# Patient Record
Sex: MALE | Race: WHITE | NOT HISPANIC OR LATINO | Employment: OTHER
[De-identification: names, ages, dates, MRNs, and addresses within clinical notes are randomized per-mention and may not be internally consistent; named-entity substitution may affect disease eponyms.]

---

## 2021-12-30 ENCOUNTER — TRANSCRIBE ORDERS (OUTPATIENT)
Dept: OTHER | Age: 71
End: 2021-12-30

## 2021-12-30 DIAGNOSIS — H02.839 DERMATOCHALASIS: Primary | ICD-10-CM

## 2022-01-26 ENCOUNTER — OFFICE VISIT (OUTPATIENT)
Dept: OPHTHALMOLOGY | Facility: CLINIC | Age: 72
End: 2022-01-26
Payer: COMMERCIAL

## 2022-01-26 DIAGNOSIS — H02.831 DERMATOCHALASIS OF BOTH UPPER EYELIDS: Primary | ICD-10-CM

## 2022-01-26 DIAGNOSIS — H57.813 BROW PTOSIS, BILATERAL: ICD-10-CM

## 2022-01-26 DIAGNOSIS — H02.834 DERMATOCHALASIS OF BOTH UPPER EYELIDS: Primary | ICD-10-CM

## 2022-01-26 PROCEDURE — 92081 LIMITED VISUAL FIELD XM: CPT | Mod: GC | Performed by: OPHTHALMOLOGY

## 2022-01-26 PROCEDURE — 99204 OFFICE O/P NEW MOD 45 MIN: CPT | Mod: GC | Performed by: OPHTHALMOLOGY

## 2022-01-26 PROCEDURE — 92285 EXTERNAL OCULAR PHOTOGRAPHY: CPT | Mod: GC | Performed by: OPHTHALMOLOGY

## 2022-01-26 RX ORDER — LISINOPRIL AND HYDROCHLOROTHIAZIDE 12.5; 2 MG/1; MG/1
1 TABLET ORAL DAILY
COMMUNITY

## 2022-01-26 RX ORDER — TADALAFIL 20 MG/1
20 TABLET ORAL DAILY PRN
COMMUNITY

## 2022-01-26 RX ORDER — NICOTINE POLACRILEX 4 MG/1
20 GUM, CHEWING ORAL DAILY
COMMUNITY

## 2022-01-26 RX ORDER — SIMVASTATIN 40 MG
40 TABLET ORAL AT BEDTIME
COMMUNITY

## 2022-01-26 RX ORDER — FINASTERIDE 5 MG/1
1 TABLET, FILM COATED ORAL DAILY
COMMUNITY
Start: 2021-01-28

## 2022-01-26 RX ORDER — AZITHROMYCIN 250 MG/1
1 TABLET, FILM COATED ORAL DAILY
COMMUNITY
Start: 2021-11-23 | End: 2022-11-22

## 2022-01-26 ASSESSMENT — CONF VISUAL FIELD: COMMENTS: TANGENT VISUAL FIELD PERFORMED TODAY.

## 2022-01-26 ASSESSMENT — TONOMETRY
IOP_METHOD: ICARE
OD_IOP_MMHG: 17
OS_IOP_MMHG: 16

## 2022-01-26 ASSESSMENT — LAGOPHTHALMOS
OS_LAGOPHTHALMOS: 0
OD_LAGOPHTHALMOS: 0

## 2022-01-26 ASSESSMENT — VISUAL ACUITY
METHOD: SNELLEN - LINEAR
OD_SC: 20/20
OD_SC+: -2
OS_SC: 20/50

## 2022-01-26 ASSESSMENT — EXTERNAL EXAM - RIGHT EYE: OD_EXAM: BROW PTOSIS, WITH FRONTALIS RELAXED, BROW IS BELOW SUPERIOR ORBITAL RIM AND LATERALLY INLINE WITH UPPER LASHES

## 2022-01-26 ASSESSMENT — MARGIN REFLEX DISTANCE
OD_MRD1: 2
OS_MRD1: 1

## 2022-01-26 ASSESSMENT — EXTERNAL EXAM - LEFT EYE: OS_EXAM: BROW PTOSIS, WITH FRONTALIS RELAXED, BROW IS BELOW SUPERIOR ORBITAL RIM AND LATERALLY INLINE WITH UPPER LASHES

## 2022-01-26 ASSESSMENT — SLIT LAMP EXAM - LIDS
COMMENTS: HEAVY DERMATOCHALASIS RESTING ON LASHES, TRUE PTOSIS
COMMENTS: HEAVY DERMATOCHALASIS RESTING ON LASHES, TRUE PTOSIS

## 2022-01-26 ASSESSMENT — LEVATOR FUNCTION
OS_LEVATOR: 15
OD_LEVATOR: 15

## 2022-01-26 NOTE — LETTER
2022         RE:  :  MRN: Regis Bryant  1950  [id number]     Dear Dr. Huang,    Thank you for asking me to see your patient, Regis Bryant, for an oculoplastic   consultation.  My assessment and plan are below.  For further details, please see my attached clinic note.           HPI:     Chief Complaint(s) and History of Present Illness(es)     Droopy Eye Lid Evaluation     Laterality: right upper lid and left upper lid       Comments     Patient referred by Dr. Huang at [facility] for dermatochalasis   consultation. Pt notes peripheral vision is not as good as it should be.   Lids seem to be getting in the way of his ability to see things.      Regis Bryant is a 71 year old male who has noted gradual onset of droopy eyelids over the past years. The droopy eyelid is interfering with activities of daily living including driving, and reading. He can sometimes see his eyelids when looking up and to the sides. The patient denies double vision, variability of the eyelid position, or dry eye symptoms.     EXAM:     MRD1: 2 mm on right, 1 mm on left  PFH:  12 mm on right, 10 mm on left  Dermatochalasis with excess skin touching eyelashes   Brow ptosis with brow resting below superior orbital rim     VISUAL FIELD:  Right eye untaped:35 degrees Right eye taped:50 degrees  Left eye untaped:35 degrees Left eye taped:50 degrees    Assessment & Plan     Regis Bryant is a 71 year old male with the following diagnoses:   1. Dermatochalasis of both upper eyelids    2. Brow ptosis, bilateral       PLAN:  Both upper eyelid blepharoplasty and external browpexy    We did discuss he has rather significant brow ptosis. We discussed brow lifting options including forehead lifting, direct brow and EBP. He would like to proceed with conservative options.    Due to significant brow ptosis, blepharoplasty alone would be unsuccesfull in addressing the lateral hooding which is  obstructing vision. Blepharoplasty alone would result in sewing very thin eyelid skin to thick sub-brow skin, and even with that, fail to address lateral hooding which is obstructing vision.      ANTICOAGULATION:  None        Again, thank you for allowing me to participate in the care of your patient.      Sincerely,    Alban Mensah MD  Department of Ophthalmology and Visual Neurosciences  [facility]    CC: Denise Huang, SB  Via Fax: [phone]

## 2022-01-26 NOTE — PROGRESS NOTES
Oculoplastic Clinic New Patient    Patient: Regis Bryant MRN# [id number]   YOB: 1950 Age: 71 year old   Date of Visit: Jan 26, 2022    CC: Droopy eyelids obstructing vision.              HPI:     Chief Complaint(s) and History of Present Illness(es)     Droopy Eye Lid Evaluation     Laterality: right upper lid and left upper lid       Comments     Patient referred by Dr. Huang at [facility] for dermatochalasis   consultation. Pt notes peripheral vision is not as good as it should be.   Lids seem to be getting in the way of his ability to see things.      Regis Bryant is a 71 year old male who has noted gradual onset of droopy eyelids over the past years. The droopy eyelid is interfering with activities of daily living including driving, and reading. He can sometimes see his eyelids when looking up and to the sides. The patient denies double vision, variability of the eyelid position, or dry eye symptoms.     EXAM:     MRD1: 2 mm on right, 1 mm on left  PFH:  12 mm on right, 10 mm on left  Dermatochalasis with excess skin touching eyelashes   Brow ptosis with brow resting below superior orbital rim     VISUAL FIELD:  Right eye untaped:35 degrees Right eye taped:50 degrees  Left eye untaped:35 degrees Left eye taped:50 degrees    Assessment & Plan     Regis Bryant is a 71 year old male with the following diagnoses:   1. Dermatochalasis of both upper eyelids    2. Brow ptosis, bilateral       PLAN:  Both upper eyelid blepharoplasty and external browpexy [id number].    Note, Ana Rosa's friend.     We did discuss he has rather significant brow ptosis. We discussed brow lifting options including forehead lifting, direct brow and EBP. He would like to proceed with conservative options.    Due to significant brow ptosis, blepharoplasty alone would be unsuccesfull in addressing the lateral hooding which is obstructing vision. Blepharoplasty alone would result in sewing  very thin eyelid skin to thick sub-brow skin, and even with that, fail to address lateral hooding which is obstructing vision.      ANTICOAGULATION:  None         PHOTOS DEMONSTRATE:  Significant dermatochalasis with lids resting on eyelashes and obstructing visual axis  Brow ptosis with thicker brow skin and hairs below the lateral superior orbital rim    Dino Chambers MD  Resident Physician, PGY-2  Ophthalmology      Attending Physician Attestation: Complete documentation of historical and exam elements from today's encounter can be found in the full encounter summary report (not reduplicated in this progress note). I personally obtained the chief complaint(s) and history of present illness. I confirmed and edited as necessary the review of systems, past medical/surgical history, family history, social history, and examination findings as documented by others; and I examined the patient myself. I personally reviewed the relevant tests, images, and reports as documented above. I formulated and edited as necessary the assessment and plan and discussed the findings and management plan with the patient. Alban Mensah MD    Today with Regis Bryant I reviewed the indications, risks, benefits, and alternatives of the proposed surgical procedure including, but not limited to, failure obtain the desired result  and need for additional surgery, bleeding, infection, loss of vision, loss of the eye, and the remote possibility of permanent damage to any organ system or death with the use of anesthesia.  I provided multiple opportunities for the questions, answered all questions to the best of my ability, and confirmed that my answers and my discussion were understood.

## 2022-01-26 NOTE — NURSING NOTE
Chief Complaints and History of Present Illnesses   Patient presents with     Droopy Eye Lid Evaluation       Chief Complaint(s) and History of Present Illness(es)     Droopy Eye Lid Evaluation     Laterality: right upper lid and left upper lid              Comments     Patient referred by Dr. Huang at [facility] for dermatochalasis consultation. Pt notes peripheral vision is not as good as it should be. Lids seem to be getting in the way of his ability to see things.                     Kandis Villalta, COA

## 2022-02-11 DIAGNOSIS — Z11.59 ENCOUNTER FOR SCREENING FOR OTHER VIRAL DISEASES: Primary | ICD-10-CM

## 2022-02-23 ENCOUNTER — TRANSFERRED RECORDS (OUTPATIENT)
Dept: HEALTH INFORMATION MANAGEMENT | Facility: CLINIC | Age: 72
End: 2022-02-23

## 2022-03-08 ENCOUNTER — ANESTHESIA EVENT (OUTPATIENT)
Dept: SURGERY | Facility: AMBULATORY SURGERY CENTER | Age: 72
End: 2022-03-08
Payer: COMMERCIAL

## 2022-03-09 ENCOUNTER — ANESTHESIA (OUTPATIENT)
Dept: SURGERY | Facility: AMBULATORY SURGERY CENTER | Age: 72
End: 2022-03-09
Payer: COMMERCIAL

## 2022-03-09 ENCOUNTER — HOSPITAL ENCOUNTER (OUTPATIENT)
Facility: AMBULATORY SURGERY CENTER | Age: 72
End: 2022-03-09
Attending: OPHTHALMOLOGY | Admitting: OPHTHALMOLOGY
Payer: COMMERCIAL

## 2022-03-09 VITALS
SYSTOLIC BLOOD PRESSURE: 134 MMHG | DIASTOLIC BLOOD PRESSURE: 73 MMHG | OXYGEN SATURATION: 97 % | HEART RATE: 52 BPM | TEMPERATURE: 98.1 F | RESPIRATION RATE: 16 BRPM

## 2022-03-09 DIAGNOSIS — H02.834 DERMATOCHALASIS OF BOTH UPPER EYELIDS: ICD-10-CM

## 2022-03-09 DIAGNOSIS — H57.813 BROW PTOSIS, BILATERAL: ICD-10-CM

## 2022-03-09 DIAGNOSIS — H02.831 DERMATOCHALASIS OF BOTH UPPER EYELIDS: ICD-10-CM

## 2022-03-09 PROCEDURE — 15823 BLEPHARP UPR EYELID XCSV SKN: CPT | Mod: 50 | Performed by: OPHTHALMOLOGY

## 2022-03-09 PROCEDURE — G8918 PT W/O PREOP ORDER IV AB PRO: HCPCS

## 2022-03-09 PROCEDURE — G8907 PT DOC NO EVENTS ON DISCHARG: HCPCS

## 2022-03-09 PROCEDURE — 15823 BLEPHARP UPR EYELID XCSV SKN: CPT | Mod: E3

## 2022-03-09 PROCEDURE — 67900 REPAIR BROW DEFECT: CPT | Mod: RT

## 2022-03-09 PROCEDURE — 67900 REPAIR BROW DEFECT: CPT | Mod: 50 | Performed by: OPHTHALMOLOGY

## 2022-03-09 RX ORDER — SODIUM CHLORIDE, SODIUM LACTATE, POTASSIUM CHLORIDE, CALCIUM CHLORIDE 600; 310; 30; 20 MG/100ML; MG/100ML; MG/100ML; MG/100ML
INJECTION, SOLUTION INTRAVENOUS CONTINUOUS
Status: DISCONTINUED | OUTPATIENT
Start: 2022-03-09 | End: 2022-03-10 | Stop reason: HOSPADM

## 2022-03-09 RX ORDER — PROPOFOL 10 MG/ML
INJECTION, EMULSION INTRAVENOUS PRN
Status: DISCONTINUED | OUTPATIENT
Start: 2022-03-09 | End: 2022-03-09

## 2022-03-09 RX ORDER — OXYCODONE HYDROCHLORIDE 5 MG/1
5 TABLET ORAL EVERY 4 HOURS PRN
Status: DISCONTINUED | OUTPATIENT
Start: 2022-03-09 | End: 2022-03-10 | Stop reason: HOSPADM

## 2022-03-09 RX ORDER — ONDANSETRON 4 MG/1
4 TABLET, ORALLY DISINTEGRATING ORAL EVERY 30 MIN PRN
Status: DISCONTINUED | OUTPATIENT
Start: 2022-03-09 | End: 2022-03-10 | Stop reason: HOSPADM

## 2022-03-09 RX ORDER — FENTANYL CITRATE 0.05 MG/ML
INJECTION, SOLUTION INTRAMUSCULAR; INTRAVENOUS PRN
Status: DISCONTINUED | OUTPATIENT
Start: 2022-03-09 | End: 2022-03-09

## 2022-03-09 RX ORDER — ACETAMINOPHEN 325 MG/1
975 TABLET ORAL ONCE
Status: COMPLETED | OUTPATIENT
Start: 2022-03-09 | End: 2022-03-09

## 2022-03-09 RX ORDER — LIDOCAINE 40 MG/G
CREAM TOPICAL
Status: DISCONTINUED | OUTPATIENT
Start: 2022-03-09 | End: 2022-03-10 | Stop reason: HOSPADM

## 2022-03-09 RX ORDER — FENTANYL CITRATE 50 UG/ML
25 INJECTION, SOLUTION INTRAMUSCULAR; INTRAVENOUS
Status: DISCONTINUED | OUTPATIENT
Start: 2022-03-09 | End: 2022-03-10 | Stop reason: HOSPADM

## 2022-03-09 RX ORDER — LABETALOL HYDROCHLORIDE 5 MG/ML
10 INJECTION, SOLUTION INTRAVENOUS
Status: DISCONTINUED | OUTPATIENT
Start: 2022-03-09 | End: 2022-03-10 | Stop reason: HOSPADM

## 2022-03-09 RX ORDER — KETOROLAC TROMETHAMINE 30 MG/ML
15 INJECTION, SOLUTION INTRAMUSCULAR; INTRAVENOUS EVERY 6 HOURS PRN
Status: DISCONTINUED | OUTPATIENT
Start: 2022-03-09 | End: 2022-03-10 | Stop reason: HOSPADM

## 2022-03-09 RX ORDER — ERYTHROMYCIN 5 MG/G
OINTMENT OPHTHALMIC
Qty: 3.5 G | Refills: 0 | Status: SHIPPED | OUTPATIENT
Start: 2022-03-09

## 2022-03-09 RX ORDER — FENTANYL CITRATE 50 UG/ML
25 INJECTION, SOLUTION INTRAMUSCULAR; INTRAVENOUS EVERY 5 MIN PRN
Status: DISCONTINUED | OUTPATIENT
Start: 2022-03-09 | End: 2022-03-10 | Stop reason: HOSPADM

## 2022-03-09 RX ORDER — LIDOCAINE HYDROCHLORIDE 20 MG/ML
INJECTION, SOLUTION INFILTRATION; PERINEURAL PRN
Status: DISCONTINUED | OUTPATIENT
Start: 2022-03-09 | End: 2022-03-09

## 2022-03-09 RX ORDER — TETRACAINE HYDROCHLORIDE 5 MG/ML
SOLUTION OPHTHALMIC PRN
Status: DISCONTINUED | OUTPATIENT
Start: 2022-03-09 | End: 2022-03-09 | Stop reason: HOSPADM

## 2022-03-09 RX ORDER — MEPERIDINE HYDROCHLORIDE 25 MG/ML
12.5 INJECTION INTRAMUSCULAR; INTRAVENOUS; SUBCUTANEOUS
Status: DISCONTINUED | OUTPATIENT
Start: 2022-03-09 | End: 2022-03-10 | Stop reason: HOSPADM

## 2022-03-09 RX ORDER — ONDANSETRON 2 MG/ML
4 INJECTION INTRAMUSCULAR; INTRAVENOUS EVERY 30 MIN PRN
Status: DISCONTINUED | OUTPATIENT
Start: 2022-03-09 | End: 2022-03-10 | Stop reason: HOSPADM

## 2022-03-09 RX ORDER — ERYTHROMYCIN 5 MG/G
OINTMENT OPHTHALMIC PRN
Status: DISCONTINUED | OUTPATIENT
Start: 2022-03-09 | End: 2022-03-09 | Stop reason: HOSPADM

## 2022-03-09 RX ORDER — LORAZEPAM 2 MG/ML
.5-1 INJECTION INTRAMUSCULAR
Status: DISCONTINUED | OUTPATIENT
Start: 2022-03-09 | End: 2022-03-10 | Stop reason: HOSPADM

## 2022-03-09 RX ORDER — HYDRALAZINE HYDROCHLORIDE 20 MG/ML
2.5-5 INJECTION INTRAMUSCULAR; INTRAVENOUS EVERY 10 MIN PRN
Status: DISCONTINUED | OUTPATIENT
Start: 2022-03-09 | End: 2022-03-10 | Stop reason: HOSPADM

## 2022-03-09 RX ORDER — ALBUTEROL SULFATE 0.83 MG/ML
2.5 SOLUTION RESPIRATORY (INHALATION) EVERY 4 HOURS PRN
Status: DISCONTINUED | OUTPATIENT
Start: 2022-03-09 | End: 2022-03-10 | Stop reason: HOSPADM

## 2022-03-09 RX ADMIN — FENTANYL CITRATE 25 MCG: 0.05 INJECTION, SOLUTION INTRAMUSCULAR; INTRAVENOUS at 09:28

## 2022-03-09 RX ADMIN — PROPOFOL 150 MG: 10 INJECTION, EMULSION INTRAVENOUS at 09:33

## 2022-03-09 RX ADMIN — ACETAMINOPHEN 975 MG: 325 TABLET ORAL at 08:12

## 2022-03-09 RX ADMIN — LIDOCAINE HYDROCHLORIDE 60 MG: 20 INJECTION, SOLUTION INFILTRATION; PERINEURAL at 09:33

## 2022-03-09 RX ADMIN — SODIUM CHLORIDE, SODIUM LACTATE, POTASSIUM CHLORIDE, CALCIUM CHLORIDE: 600; 310; 30; 20 INJECTION, SOLUTION INTRAVENOUS at 08:18

## 2022-03-09 ASSESSMENT — LIFESTYLE VARIABLES: TOBACCO_USE: 1

## 2022-03-09 NOTE — ANESTHESIA CARE TRANSFER NOTE
Patient: Regis Bryant    Procedure: Procedure(s):  Both upper eyelid blepharoplasty and  external browpexy       Diagnosis: Dermatochalasis of both upper eyelids [H02.831, H02.834]  Brow ptosis, bilateral [H57.813]  Diagnosis Additional Information: No value filed.    Anesthesia Type:   MAC     Note:    Oropharynx: oropharynx clear of all foreign objects  Level of Consciousness: awake  Oxygen Supplementation: room air      Dentition: dentition unchanged  Vital Signs Stable: post-procedure vital signs reviewed and stable  Report to RN Given: handoff report given  Patient transferred to: Phase II    Handoff Report: Identifed the Patient, Identified the Reponsible Provider, Reviewed the pertinent medical history, Discussed the surgical course, Reviewed Intra-OP anesthesia mangement and issues during anesthesia, Set expectations for post-procedure period and Allowed opportunity for questions and acknowledgement of understanding      Vitals:  Vitals Value Taken Time   BP     Temp     Pulse     Resp     SpO2         Electronically Signed By: ERIC Moody CRNA  March 9, 2022  10:16 AM

## 2022-03-09 NOTE — DISCHARGE INSTRUCTIONS
Post-operative Instructions  Ophthalmic Plastic and Reconstructive Surgery    Alban Mensah M.D.     All instructions apply to the operated eye(s) or eyelid(s).    Wound care and personal care    ? Apply ice compresses 15 minutes of every hour while awake for 2 days. As long as there is no further bleeding, after two days, switch to warm water compresses for five minutes, four times a day until seen by your physician.   ? You may shower or wash your hair the day after surgery. Do not go swimming for at least 2 weeks to prevent contamination of your wounds.  ? You may go for walks and light activity is ok, but no heavy (over 15 pounds) lifting, bending or excessive straining for one week.   ? Do not apply make-up to the eyes or eyelids for 2 weeks after surgery.  ? Expect some swelling, bruising, black eye (even into the lower eyelids and cheeks). Also expect serum caking, crusting and discharge from the eye and/or incisions. A small amount of surface bleeding, and depending on the type of surgery, bleeding from the inside of the eyelid, is normal for the first 48 hours.  ? Avoid straining, bending at the waist, or lifting more than 15 pounds for 1 week. Sleeping with your head elevated, such as in a recliner, for the first several days can help swelling resolve more quickly.   ? Do continue to ambulate (walk) as you normally would - being sedentary after surgery can cause blood clots.   ? Your eye(s) and eyelid(s) may be painful and tender. This is normal after surgery.      Contact information and follow-up  ? Return to the Eye Clinic for a follow-up appointment with your physician as scheduled. If no appointment has been scheduled:   - [facility] eye clinic: [phone] for an appointment with Dr. Mensah within 2 to 3 weeks from your date of surgery.      -  [facility] eye clinic: [phone] for an appointment with Dr. Mensah within 2 to 3 weeks from your date of surgery.      ? For severe pain, bleeding, or loss of vision, call the [facility] at [phone] or [facility] at [phone].     After hours or on weekends and holidays, call [phone] and ask to speak with the ophthalmologist on call.    An on call person can be reached after hours for concerns. The on call doctor should not call in medication refill requests after hours or on weekends, so please plan accordingly. An effort has been made to provide adequate pain medications following every surgery, and refills will not be provided in most instances.     Medications  ? Restart all regular home medications and eye drops. If you take Plavix or Aspirin on a regular basis, wait for 72 hours after your surgery before restarting these in order to decrease the risk of bleeding complications.  ? Avoid aspirin and aspirin-like medications (Motrin, Aleve, Ibuprofen, Justine-[place] etc) for 72 hours to reduce the risk of bleeding. You may take Tylenol (acetaminophen) for pain.  ? In addition to your home medications, take the following post-operative medications as prescribed by your physician.    ? Apply antibiotic ointment to all sutures three times a day, and into the operated eye(s) at night.    [facility]   Adult Discharge Orders & Instructions     For 24 hours after surgery    1. Get plenty of rest.  A responsible adult must stay with you for at least 24 hours after you leave the hospital.   2. Do not drive or use heavy equipment.  If you have weakness or tingling, don't drive or use heavy equipment until this feeling goes away.  3. Do not drink alcohol.  4. Avoid strenuous or risky activities.  Ask for help when climbing stairs.   5. You may feel lightheaded.  IF so, sit for a few minutes before standing.  Have someone help you get up.   6. If you have nausea (feel sick to your stomach): Drink only clear liquids such as apple juice, ginger ale, broth or 7-Up.  Rest may  also help.  Be sure to drink enough fluids.  Move to a regular diet as you feel able.  7. You may have a slight fever. Call the doctor if your fever is over 100 F (37.7 C) (taken under the tongue) or lasts longer than 24 hours.  8. You may have a dry mouth, a sore throat, muscle aches or trouble sleeping.  These should go away after 24 hours.  9. Do not make important or legal decisions.     Call your doctor for any of the followin.  Signs of infection (fever, growing tenderness at the surgery site, a large amount of drainage or bleeding, severe pain, foul-smelling drainage, redness, swelling).    2. It has been over 8 to 10 hours since surgery and you are still not able to urinate (pass water).    3.  Headache for over 24 hours.    4.  Numbness, tingling or weakness the day after surgery (if you had spinal anesthesia).                  5. Signs of Covid-19 infection (temperature over 100 degrees, shortness of breath, cough, loss of taste/smell, generalized body aches, persistent headache,                  chills, sore throat, nausea/vomiting/diarrhea).    To contact Dr Mensah call:  [phone] - Day  [phone] - After Hours, ask for the Opthomologist on call

## 2022-03-09 NOTE — ANESTHESIA PREPROCEDURE EVALUATION
Anesthesia Pre-Procedure Evaluation    Patient: Regis Bryant   MRN: [id number] : 1950        Procedure : Procedure(s):  Both upper eyelid blepharoplasty and  external browpexy          History reviewed. No pertinent past medical history.   History reviewed. No pertinent surgical history.   No Known Allergies   Social History     Tobacco Use     Smoking status: Former Smoker     Smokeless tobacco: Never Used   Substance Use Topics     Alcohol use: Not on file      Wt Readings from Last 1 Encounters:   No data found for Wt        Anesthesia Evaluation   Pt has had prior anesthetic. Type: MAC.    No history of anesthetic complications       ROS/MED HX  ENT/Pulmonary:     (+) tobacco use, Past use,     Neurologic:  - neg neurologic ROS     Cardiovascular:     (+) hypertension-----    METS/Exercise Tolerance:     Hematologic:  - neg hematologic  ROS     Musculoskeletal:  - neg musculoskeletal ROS     GI/Hepatic:  - neg GI/hepatic ROS     Renal/Genitourinary:  - neg Renal ROS     Endo:  - neg endo ROS     Psychiatric/Substance Use:  - neg psychiatric ROS     Infectious Disease:  - neg infectious disease ROS     Malignancy:  - neg malignancy ROS     Other:  - neg other ROS          Physical Exam    Airway  airway exam normal           Respiratory Devices and Support         Dental  no notable dental history         Cardiovascular   cardiovascular exam normal          Pulmonary   pulmonary exam normal                OUTSIDE LABS:  CBC: No results found for: WBC, HGB, HCT, PLT  BMP: No results found for: NA, POTASSIUM, CHLORIDE, CO2, BUN, CR, GLC  COAGS: No results found for: PTT, INR, FIBR  POC: No results found for: BGM, HCG, HCGS  HEPATIC: No results found for: ALBUMIN, PROTTOTAL, ALT, AST, GGT, ALKPHOS, BILITOTAL, BILIDIRECT, LIAM  OTHER: No results found for: PH, LACT, A1C, DEISI, PHOS, MAG, LIPASE, AMYLASE, TSH, T4, T3, CRP, SED    Anesthesia Plan    ASA Status:  2      Anesthesia Type: MAC.     -  Reason for MAC: chronic cardiopulmonary disease      Maintenance: TIVA.        Consents    Anesthesia Plan(s) and associated risks, benefits, and realistic alternatives discussed. Questions answered and patient/representative(s) expressed understanding.    - Discussed:     - Discussed with:  Patient      - Extended Intubation/Ventilatory Support Discussed: No.      - Patient is DNR/DNI Status: No    Use of blood products discussed: No .     Postoperative Care    Pain management: IV analgesics, Oral pain medications.   PONV prophylaxis: Ondansetron (or other 5HT-3), Background Propofol Infusion     Comments:                Juan Rodríguez MD

## 2022-03-09 NOTE — ANESTHESIA POSTPROCEDURE EVALUATION
Patient: Regis Bryant    Procedure: Procedure(s):  Both upper eyelid blepharoplasty and  external browpexy       Anesthesia Type:  MAC    Note:  Disposition: Outpatient   Postop Pain Control: Uneventful            Sign Out: Well controlled pain   PONV: No   Neuro/Psych: Uneventful            Sign Out: Acceptable/Baseline neuro status   Airway/Respiratory: Uneventful            Sign Out: Acceptable/Baseline resp. status   CV/Hemodynamics: Uneventful            Sign Out: Acceptable CV status; No obvious hypovolemia; No obvious fluid overload   Other NRE: NONE   DID A NON-ROUTINE EVENT OCCUR? No           Last vitals:  Vitals Value Taken Time   /74 03/09/22 1017   Temp 96.7  F (35.9  C) 03/09/22 1017   Pulse 52 03/09/22 1017   Resp 18 03/09/22 1017   SpO2 96 % 03/09/22 1017       Electronically Signed By: Juan Rodríguez MD  March 9, 2022  10:51 AM

## 2022-03-09 NOTE — OP NOTE
PREOPERATIVE DIAGNOSES:   1. Bilateral upper eyelid dermatochalasis.   2. Bilateral brow ptosis.     POSTOPERATIVE DIAGNOSES:   1. Bilateral upper eyelid dermatochalasis.   2. Bilateral brow ptosis.     PROCEDURE PERFORMED:   1. Bilateral upper blepharoplasty.   2. Bilateral external browpexy.     ANESTHESIA: Monitored with local infiltration of a 50/50 mixture of 2% lidocaine with epinephrine and 0.5% Marcaine.     SURGEON: Alban Mensah MD     COMPLICATIONS: None.     ESTIMATED BLOOD LOSS: Less than 5 mL.     HISTORY AND INDICATIONS: Regis Bryant presented with upper lid drooping interfering with superior visual field and activities of daily living due to dermatochalasis and brow ptosis. After the risks, benefits and alternatives to the proposed procedure were explained, informed consent was obtained.     DESCRIPTION OF PROCEDURE: Regis Bryant  was brought to the operating room and placed supine on the operating table. IV sedation was given. The upper lid crease and excess upper eyelid skin was marked with a marking pen and infiltrated with local anesthetic. The face was prepped and draped in the typical sterile ophthalmic fashion. The area of the brow to be elevated was marked with a marking pen. The superior brow hairs in this area was infiltrated with local anesthetic as well. Attention was directed to the right side. Skin was incised following marked lines. Skin flap was excised from the upper eyelid with high temperature cautery. Hemostasis was obtained. Orbital septum was opened nasally and the nasal fat pockett was conservatively debulked. The skin was closed with running 6-0 plain gut suture. Brow incision was made with a 15 blade and dissection was carried down to the frontalis with the Marques scissors. A 4-0 PDS suture was passed through the frontal bone periosteum approximately 1 cm above the orbital rim. This suture was then passed back through the retro-orbicularis oculi  fat at the inferior edge of the incision and this was passed in a horizontal mattress fashion and tied to bring the brow into an elevated height. The deep tissues were closed with interrupted buried 5-0 Monocryl sutures. The skin was closed with interrupted 6-0 plain gut sutures. Attention was directed to the left side where the same procedures were performed. Erythromycin ophthalmic ointment was applied to the incisions. The patient tolerated the procedure well. Regis Bryant  left the operating room in stable condition.   Alban Mensah MD

## 2022-04-03 ENCOUNTER — HEALTH MAINTENANCE LETTER (OUTPATIENT)
Age: 72
End: 2022-04-03

## 2022-04-13 ENCOUNTER — OFFICE VISIT (OUTPATIENT)
Dept: OPHTHALMOLOGY | Facility: CLINIC | Age: 72
End: 2022-04-13
Payer: COMMERCIAL

## 2022-04-13 DIAGNOSIS — H57.813 BROW PTOSIS, BILATERAL: ICD-10-CM

## 2022-04-13 DIAGNOSIS — H02.831 DERMATOCHALASIS OF BOTH UPPER EYELIDS: Primary | ICD-10-CM

## 2022-04-13 DIAGNOSIS — H02.834 DERMATOCHALASIS OF BOTH UPPER EYELIDS: Primary | ICD-10-CM

## 2022-04-13 PROCEDURE — 99024 POSTOP FOLLOW-UP VISIT: CPT | Performed by: OPHTHALMOLOGY

## 2022-04-13 ASSESSMENT — VISUAL ACUITY
METHOD: SNELLEN - LINEAR
OS_SC: 20/30
OD_SC: 20/20

## 2022-04-13 ASSESSMENT — TONOMETRY
OD_IOP_MMHG: 15
IOP_METHOD: ICARE
OS_IOP_MMHG: 12

## 2022-04-13 NOTE — NURSING NOTE
Chief Complaint(s) and History of Present Illness(es)     Post Op (Ophthalmology) Both Eyes     Laterality: both eyes    Pain scale: 0/10    Comments: S/P BUL blepharoplasty and external browpexy 3/9/22            Ana Rosa Zayas RN RN 11:50 AM 04/13/22

## 2022-04-13 NOTE — PROGRESS NOTES
Chief Complaint(s) and History of Present Illness(es)     Post Op (Ophthalmology) Both Eyes     Laterality: both eyes    Pain scale: 0/10    Comments: S/P BUL blepharoplasty and external browpexy 3/9/22           Doing well postop bilateral upper eyelid blepharoplasty  and bilateral external browpexy. Happy with outcome 5 weeks postop and notices nice improvement in peripheral vision.    - Vaseline or Aquaphor to incision qhs  - continue warm packs for another month  Return to clinic as needed.     Attending Physician Attestation: Complete documentation of historical and exam elements from today's encounter can be found in the full encounter summary report (not reduplicated in this progress note). I personally obtained the chief complaint(s) and history of present illness. I confirmed and edited as necessary the review of systems, past medical/surgical history, family history, social history, and examination findings as documented by others; and I examined the patient myself. I personally reviewed the relevant tests, images, and reports as documented above. I formulated and edited as necessary the assessment and plan and discussed the findings and management plan with the patient and family. I personally reviewed the ophthalmic test(s) associated with this encounter, agree with the interpretation(s) as documented by the resident/fellow, and have edited the corresponding report(s) as necessary. Alban Mensah MD

## 2022-07-29 ENCOUNTER — TRANSFERRED RECORDS (OUTPATIENT)
Dept: HEALTH INFORMATION MANAGEMENT | Facility: CLINIC | Age: 72
End: 2022-07-29

## 2022-08-29 ENCOUNTER — TRANSFERRED RECORDS (OUTPATIENT)
Dept: HEALTH INFORMATION MANAGEMENT | Facility: CLINIC | Age: 72
End: 2022-08-29

## 2022-10-03 ENCOUNTER — HEALTH MAINTENANCE LETTER (OUTPATIENT)
Age: 72
End: 2022-10-03

## 2022-11-04 ENCOUNTER — TRANSCRIBE ORDERS (OUTPATIENT)
Dept: OTHER | Age: 72
End: 2022-11-04

## 2022-11-04 DIAGNOSIS — D43.3 NEOPLASM OF UNCERTAIN BEHAVIOR OF CRANIAL NERVES (H): Primary | ICD-10-CM

## 2022-11-14 NOTE — TELEPHONE ENCOUNTER
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 [id number]  Alt fx. [id number] [facility]IC req 11/14/22, 11/16/22    [facility] medical records  [address] [place] [address]  Phone: [phone]  Fax: [phone] 9/27/22 MRI Head      [facility]  req 11/14/22 - PACS                             11/14/22 12:27PM [facility] and [facility] for imaging disc - Amay   11/16/22 12:25PM images received in PACS (no disc, they were able to push) sent a 2nd fax for recs - amay   *3:41PM received a call from [facility], they asked that I re-fax the request to the same fax and alt fax as the copy they received is broken, sent fax - Amay  11/18/22 8:34am Called [facility] and spoke to Silva who states the records were fax to [facility] at [phone] on 11/4/22 and talked to Myrna. Send an E-mail to [facility] if they get any records and request that [facility] send those records to [facility] fax number. Blossom  11/18/22 9:30am [facility] and [facility] send duplication. Rec already scanned in. Spoke to [facility] and there is no other records. Blossom

## 2022-11-21 DIAGNOSIS — Z01.10 ENCOUNTER FOR HEARING TEST: Primary | ICD-10-CM

## 2022-11-22 ENCOUNTER — PRE VISIT (OUTPATIENT)
Dept: OTOLARYNGOLOGY | Facility: CLINIC | Age: 72
End: 2022-11-22

## 2022-11-22 ENCOUNTER — OFFICE VISIT (OUTPATIENT)
Dept: OTOLARYNGOLOGY | Facility: CLINIC | Age: 72
End: 2022-11-22
Payer: COMMERCIAL

## 2022-11-22 ENCOUNTER — OFFICE VISIT (OUTPATIENT)
Dept: AUDIOLOGY | Facility: CLINIC | Age: 72
End: 2022-11-22
Payer: COMMERCIAL

## 2022-11-22 VITALS
SYSTOLIC BLOOD PRESSURE: 170 MMHG | BODY MASS INDEX: 27.6 KG/M2 | WEIGHT: 222 LBS | OXYGEN SATURATION: 97 % | HEIGHT: 75 IN | HEART RATE: 66 BPM | DIASTOLIC BLOOD PRESSURE: 87 MMHG

## 2022-11-22 DIAGNOSIS — H90.3 ASYMMETRICAL SENSORINEURAL HEARING LOSS: ICD-10-CM

## 2022-11-22 DIAGNOSIS — D33.3 VESTIBULAR SCHWANNOMA (H): Primary | ICD-10-CM

## 2022-11-22 DIAGNOSIS — H90.3 ASYMMETRIC SNHL (SENSORINEURAL HEARING LOSS): Primary | ICD-10-CM

## 2022-11-22 DIAGNOSIS — D33.3 ACOUSTIC NEUROMA (H): Primary | ICD-10-CM

## 2022-11-22 PROCEDURE — 99204 OFFICE O/P NEW MOD 45 MIN: CPT | Mod: GC | Performed by: OTOLARYNGOLOGY

## 2022-11-22 PROCEDURE — 92550 TYMPANOMETRY & REFLEX THRESH: CPT | Performed by: AUDIOLOGIST

## 2022-11-22 PROCEDURE — 92557 COMPREHENSIVE HEARING TEST: CPT | Performed by: AUDIOLOGIST

## 2022-11-22 PROCEDURE — 92565 STENGER TEST PURE TONE: CPT | Performed by: AUDIOLOGIST

## 2022-11-22 PROCEDURE — 99204 OFFICE O/P NEW MOD 45 MIN: CPT | Performed by: NEUROLOGICAL SURGERY

## 2022-11-22 ASSESSMENT — PAIN SCALES - GENERAL: PAINLEVEL: NO PAIN (0)

## 2022-11-22 NOTE — Clinical Note
11/22/2022       RE: Regis Bryant  [address] [place] [address]     Dear Colleague,    Thank you for referring your patient, Regis Bryant, to the [facility] [place] at [facility]. Please see a copy of my visit note below.    [facility]  Department of Neurosurgery  Center for Skull Base and Pituitary Surgery    November 22, 2022    Reason for visit:  Left vestibular schwannoma, new patient visit      Dear Ms. Mauro,    Is a pleasure to see Regis Bryant in the Center for Skull Base and Pituitary Surgery today as a new patient.  As you recall, Mr. Bryant is a pleasant 72-year-old male who presented with asymmetric hearing loss on the left.  He also has tinnitus. 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 His recent work-up included an audiogram and MRI the results of which he was referred here.    Past medical history: Cataracts, BCC, colon polyp, BPH, KAYLYNN, hypertension, hyperlipidemia, GERD, tobacco use    Allergies: No known drug allergies    Medications: Hydrochlorothiazide, lisinopril, omeprazole, simvastatin, doxazosin, finasteride    Family history: Noncontributory    Social history: He is .  He has 3 adult children.  He is retired.  He is a former smoker who quit in 2011.    Exam: He is fluent with speech and very pleasant.  His extraocular movements are intact.  His face is symmetric with activation, rated as a House-Brackman 1.  He has no pronator drift.  He has apparent full strength in all extremities.    Imaging: We reviewed the results of his MRI performed on September 27, 2022 which is uploaded into our system.  This demonstrates a intracanalicular left vestibular schwannoma that measures 13 mm across the IAC and 7 mm in width.  There is no clear cisternal extension.  No contact with the peduncle, and no hydrocephalus.    Audiogram: Audiogram  performed today displays downsloping hearing loss bilaterally.  The left is worse than the right.  Pure-tone average on the right is 56 dB, and on the left is 69 dB.  Word recognition score on the right is 84% at 80 dB, and on the left is 0% at 95 dB.    Assessment:  1.  Left intracanalicular vestibular schwannoma  2.  Bilateral hearing loss    Plan:  1.  We reviewed the results of his MRI and audiogram in setting of his hearing loss.  We described options for management including observation, radiation, and microsurgical resection.  Given that this is his first picture of the tumor and it is small, we recommended observation.    2.  We will plan to see him back in 6 months with an updated MRI and audiogram.  3.  He is using a Bicros system and is benefiting well  4.  We discussed vestibular therapy for his balance should he be interested.  5.  We encouraged her to contact us with any questions or concerns prior to his next visit.        It has been a pleasure to participate in the care of your patient. Please feel free to contact me if I may be of any assistance for Mr. Bryant    Sincerely,  Bran Tavares MD      45 minutes spent on the date of the encounter doing chart review, review of outside records, review of test results, interpretation of tests, patient visit, documentation and discussion with other provider(s)          Again, thank you for allowing me to participate in the care of your patient.      Sincerely,    Bran Tavares MD

## 2022-11-22 NOTE — PROGRESS NOTES
[facility]  Department of Neurosurgery  Center for Skull Base and Pituitary Surgery    November 22, 2022    Reason for visit:  Left vestibular schwannoma, new patient visit      Dear Ms. Mauro,    Is a pleasure to see Regis Bryant in the Center for Skull Base and Pituitary Surgery today as a new patient.  As you recall, Mr. Bryant is a pleasant 72-year-old male who presented with asymmetric hearing loss on the left.  He also has tinnitus. 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 His recent work-up included an audiogram and MRI the results of which he was referred here.    Past medical history: Cataracts, BCC, colon polyp, BPH, KAYLYNN, hypertension, hyperlipidemia, GERD, tobacco use    Allergies: No known drug allergies    Medications: Hydrochlorothiazide, lisinopril, omeprazole, simvastatin, doxazosin, finasteride    Family history: Noncontributory    Social history: He is .  He has 3 adult children.  He is retired.  He is a former smoker who quit in 2011.    Exam: He is fluent with speech and very pleasant.  His extraocular movements are intact.  His face is symmetric with activation, rated as a House-Brackman 1.  He has no pronator drift.  He has apparent full strength in all extremities.    Imaging: We reviewed the results of his MRI performed on September 27, 2022 which is uploaded into our system.  This demonstrates a intracanalicular left vestibular schwannoma that measures 13 mm across the IAC and 7 mm in width.  There is no clear cisternal extension.  No contact with the peduncle, and no hydrocephalus.    Audiogram: Audiogram performed today displays downsloping hearing loss bilaterally.  The left is worse than the right.  Pure-tone average on the right is 56 dB, and on the left is 69 dB.  Word recognition score on the right is 84% at 80 dB, and on the left is 0% at 95 dB.    Assessment:  1.  Left intracanalicular vestibular schwannoma  2.  Bilateral hearing  loss    Plan:  1.  We reviewed the results of his MRI and audiogram in setting of his hearing loss.  We described options for management including observation, radiation, and microsurgical resection.  Given that this is his first picture of the tumor and it is small, we recommended observation.    2.  We will plan to see him back in 6 months with an updated MRI and audiogram.  3.  He is using a Bicros system and is benefiting well  4.  We discussed vestibular therapy for his balance should he be interested.  5.  We encouraged her to contact us with any questions or concerns prior to his next visit.        It has been a pleasure to participate in the care of your patient. Please feel free to contact me if I may be of any assistance for Mr. Bryant    Sincerely,  Bran Tavares MD      45 minutes spent on the date of the encounter doing chart review, review of outside records, review of test results, interpretation of tests, patient visit, documentation and discussion with other provider(s)

## 2022-11-22 NOTE — NURSING NOTE
"Chief Complaint   Patient presents with     Consult     Neoplasm      Blood pressure (!) 170/87, pulse 66, height 1.905 m (6' 3\"), weight 100.7 kg (222 lb), SpO2 97 %.    Fam Cortez LPN    "

## 2022-11-22 NOTE — PROGRESS NOTES
Neurotology Clinic        Name: Regis Bryant  MRN: [id number]  Age: 72 year old year old  : 1950  Chief complaint: Left vestibular schwannoma     History of Present Illness:  Regis Bryant is a 72 year old male being seen for left side vestibular schwannoma. He is seen with our neurosurgery colleague Dr. Tavares.     He has gradual bilateral hearing loss for 8-9 years and started noticing worse hearing in left ear that has gradually worsened over the past 1-2 years. He has history of noise exposure from . He reports bilateral tinnitus worse in the left ear. He has been having dizziness that he describes as lightheadedness when he stands up. He recently underwent a stress test and has an appointment with a cardiologist to review the results. Denies vertigo, imbalance, difficulty walking or history of falls. Denies facial weakness, numbness, or twitching. He used to use conventional hearing aids in both ears for about 8 years. His left ear speech discrim has declined from 84% in 2019 to 12% in his recent testing reportedly which prompted an MRI which showed left sided vestibular schwannoma. He had Bi[employer] hearing aids fitting in September which have been helping him a lot. He will be in [place] from -March next year.     No past medical history on file.    Past Surgical History:   Procedure Laterality Date     BLEPHAROPLASTY BILATERAL Bilateral 3/9/2022    Procedure: Both upper eyelid blepharoplasty and;  Surgeon: Alban Mensah MD;  Location: [facility] OR     COSMETIC BROWPEXY BILATERAL Bilateral 3/9/2022    Procedure: external browpexy;  Surgeon: Alban Mensah MD;  Location:  OR       No family history on file.    Social History     Tobacco Use     Smoking status: Former     Smokeless tobacco: Never         Patient Supplied Answers to Review of Systems  The remainder of the 10 point review of systems is otherwise negative.      Physical Exam:   BP (!) 170/87   Pulse  "66   Ht 1.905 m (6' 3\")   Wt 100.7 kg (222 lb)   SpO2 97%   BMI 27.75 kg/m      Constitutional:  The patient was accompanied by his wife, well-groomed, and in no acute distress.     Skin: Normal:  warm and pink without rash   Neurologic: Alert and oriented x 3.  CN's III-XII within normal limits.  Voice normal.    Psychiatric: The patient's affect was calm, cooperative, and appropriate.     Communication:  Normal; communicates verbally, normal voice quality.   Respiratory: Breathing comfortably without stridor or exertion of accessory muscles.    Ears: Pinnae and tragus non-tender.       - Right ear: Ear canal clear. TM intact. No effusion.   - Left ear:  Ear canal clear. TM intact. No effusion.      Audiogram: Audiogram from today was independently reviewed.        Right: Speech reception threshold is 35 dB with 84 % word recognition. Tympanogram A type   Left: Speech reception threshold is 40 dB with 0 % word recognition. Tympanogram A type     Imaging:   MRI IAC from 9/2022 was independently reviewed. This demonstrated left side intracanalicular lesion measuring about 12x7mm. There is no T2 fundal cap.     Assessment and plan:   Regis Bryant is a 72 year old male presenting with    1) Left side vestibular schwannoma (first MRI 9/2022, intracanalicular)  2) Left side moderate to profound downsloping sensorineural hearing loss (WRS 0%, AAO Class D)  3) Right side normal to severe downsloping sensorineural hearing loss    We reviewed the diagnosis and typical natural history of vestibular schwannoma, specifically in context of having an intracanalicular lesion that has already caused hearing loss.  This hearing loss is permanent and cannot be improved with treatments.  We reviewed the involvement of the vestibular nerve as the nerve origin for the tumor and how this requires central compensation as the body learns to rely on the right side for function.     Treatment options and all risks and benefits of  " each were discussed, including observation, radiosurgery, and surgical resection. He has all options available. It is a reasonable option to obtain another scan in one year and continue watchful waiting.  We would get a scan before one year if he developed worsening neurologic symptoms which we reviewed in detail.     We reviewed the goal of radiosurgery being to stop tumor growth rather than to remove the lesion. We reviewed what this outpatient procedure entails. Success rates are in the 80% range or higher for a tumor of this size, and those with growth do not necessarily go on to have surgery if the tumor remains relatively small, but surgery can be necessary for abundant growth. The risks and side effects of radiosurgery are typically delayed in onset but include facial paralysis, dizziness, hearing loss, and risk to surrounding structures. There is also a small risk of malignant degeneration.     We also reviewed microsurgical excision. He would be a candidate for translabyrinthine approach. We discussed that the surgery has risks of worsening his hearing or facial movement. There are also risks of spinal fluid leak, infection, other neurologic dysfunction, and rare risks of stroke, coma, or death. We also discussed length of hospital stay averages 3 to 5 days, and recovery time up to 6 to 12 weeks with activity restrictions. He understands the risk to the facial nerve increases slightly with growth of the tumor.     The patient asked insightful questions and indicated that she would like to pursue observation at this time and will return to clinic in 6 months with new audio and MRI. The patient understands that he should return to clinic promptly if she experiences worsening or new symptoms including dizziness, facial numbness, headache, or changes in vision.  The patient expressed understanding and is in agreement with this plan.  All questions were answered.    Follow-Up: in 6 months with repeat MRI and  audiogram    Urvashi Torres MD MPH   Fellow Physician  Otology & Neurotology  [facility]     Yani Hernandez MD  Otology & Neurotology  [facility]    I, Yani Hernandez MD, saw this patient with the resident/fellow and agree with the resident's findings and plan of care as documented in the resident's/fellow's note.

## 2022-11-22 NOTE — PATIENT INSTRUCTIONS
You were seen today with Dr. Yani Hernandez and Dr. Bran Tavares. Your primary contact after today's visit is: SUMANTH Mixon    Next steps:  [facility] You do not need another hearing test in April. We will call you with these results. If stable, we can schedule your next MRI 1 year from April.       How to Contact Us  Send a Dudat message to your provider.   Call the clinic - your call will be routed appropriately.   ENT Clinic: [phone]   Neurosurgery Clinic: [phone]  To speak directly to an RN Care Coordinator:  Apurva RN: [phone]  Bruna RN: [phone]    Note: We do our best to check voicemail frequently throughout the day and make every effort to return calls within 1-2 business days. For urgent matters, please use the general clinic phone numbers listed above.

## 2022-11-22 NOTE — LETTER
2022       RE: Regis Bryant  [address]  [place] [address]     Dear Colleague,    Thank you for referring your patient, Regis Bryant, to the [facility]AR NOSE [facility] THROAT CLINIC [place] at [facility]. Please see a copy of my visit note below.      Neurotology Clinic        Name: Regis Bryant  MRN: [id number]  Age: 72 year old year old  : 1950  Chief complaint: Left vestibular schwannoma     History of Present Illness:  Regis Bryant is a 72 year old male being seen for left side vestibular schwannoma. He is seen with our neurosurgery colleague Dr. Tavares.     He has gradual bilateral hearing loss for 8-9 years and started noticing worse hearing in left ear that has gradually worsened over the past 1-2 years. He has history of noise exposure from . He reports bilateral tinnitus worse in the left ear. He has been having dizziness that he describes as lightheadedness when he stands up. He recently underwent a stress test and has an appointment with a cardiologist to review the results. Denies vertigo, imbalance, difficulty walking or history of falls. Denies facial weakness, numbness, or twitching. He used to use conventional hearing aids in both ears for about 8 years. His left ear speech discrim has declined from 84% in 2019 to 12% in his recent testing reportedly which prompted an MRI which showed left sided vestibular schwannoma. He had [employer] hearing aids fitting in September which have been helping him a lot. He will be in [place] from -March next year.     No past medical history on file.    Past Surgical History:   Procedure Laterality Date     BLEPHAROPLASTY BILATERAL Bilateral 3/9/2022    Procedure: Both upper eyelid blepharoplasty and;  Surgeon: Alban Mensah MD;  Location: [facility] OR     COSMETIC BROWPEXY BILATERAL Bilateral 3/9/2022    Procedure: external  "browpexy;  Surgeon: Alban Mensah MD;  Location: [facility] OR       No family history on file.    Social History     Tobacco Use     Smoking status: Former     Smokeless tobacco: Never         Patient Supplied Answers to Review of Systems  The remainder of the 10 point review of systems is otherwise negative.      Physical Exam:   BP (!) 170/87   Pulse 66   Ht 1.905 m (6' 3\")   Wt 100.7 kg (222 lb)   SpO2 97%   BMI 27.75 kg/m      Constitutional:  The patient was accompanied by his wife, well-groomed, and in no acute distress.     Skin: Normal:  warm and pink without rash   Neurologic: Alert and oriented x 3.  CN's III-XII within normal limits.  Voice normal.    Psychiatric: The patient's affect was calm, cooperative, and appropriate.     Communication:  Normal; communicates verbally, normal voice quality.   Respiratory: Breathing comfortably without stridor or exertion of accessory muscles.    Ears: Pinnae and tragus non-tender.       - Right ear: Ear canal clear. TM intact. No effusion.   - Left ear:  Ear canal clear. TM intact. No effusion.      Audiogram: Audiogram from today was independently reviewed.        Right: Speech reception threshold is 35 dB with 84 % word recognition. Tympanogram A type   Left: Speech reception threshold is 40 dB with 0 % word recognition. Tympanogram A type     Imaging:   MRI IAC from 9/2022 was independently reviewed. This demonstrated left side intracanalicular lesion measuring about 12x7mm. There is no T2 fundal cap.     Assessment and plan:   Regis Bryant is a 72 year old male presenting with    1) Left side vestibular schwannoma (first MRI 9/2022, intracanalicular)  2) Left side moderate to profound downsloping sensorineural hearing loss (WRS 0%, AAO Class D)  3) Right side normal to severe downsloping sensorineural hearing loss    We reviewed the diagnosis and typical natural history of vestibular schwannoma, specifically in context of having an intracanalicular lesion " that has already caused hearing loss.  This hearing loss is permanent and cannot be improved with treatments.  We reviewed the involvement of the vestibular nerve as the nerve origin for the tumor and how this requires central compensation as the body learns to rely on the right side for function.     Treatment options and all risks and benefits of  each were discussed, including observation, radiosurgery, and surgical resection. He has all options available. It is a reasonable option to obtain another scan in one year and continue watchful waiting.  We would get a scan before one year if he developed worsening neurologic symptoms which we reviewed in detail.     We reviewed the goal of radiosurgery being to stop tumor growth rather than to remove the lesion. We reviewed what this outpatient procedure entails. Success rates are in the 80% range or higher for a tumor of this size, and those with growth do not necessarily go on to have surgery if the tumor remains relatively small, but surgery can be necessary for abundant growth. The risks and side effects of radiosurgery are typically delayed in onset but include facial paralysis, dizziness, hearing loss, and risk to surrounding structures. There is also a small risk of malignant degeneration.     We also reviewed microsurgical excision. He would be a candidate for translabyrinthine approach. We discussed that the surgery has risks of worsening his hearing or facial movement. There are also risks of spinal fluid leak, infection, other neurologic dysfunction, and rare risks of stroke, coma, or death. We also discussed length of hospital stay averages 3 to 5 days, and recovery time up to 6 to 12 weeks with activity restrictions. He understands the risk to the facial nerve increases slightly with growth of the tumor.     The patient asked insightful questions and indicated that she would like to pursue observation at this time and will return to clinic in 6 months with  new audio and MRI. The patient understands that he should return to clinic promptly if she experiences worsening or new symptoms including dizziness, facial numbness, headache, or changes in vision.  The patient expressed understanding and is in agreement with this plan.  All questions were answered.    Follow-Up: in 6 months with repeat MRI and audiogram    Urvashi Torres MD MPH   Fellow Physician  Otology & Neurotology  [facility]     Yani Hernandez MD  Otology & Neurotology  [facility]    I, Yani Hernandez MD, saw this patient with the resident/fellow and agree with the resident's findings and plan of care as documented in the resident's/fellow's note.              Again, thank you for allowing me to participate in the care of your patient.      Sincerely,    Yani Henrandez MD

## 2022-11-22 NOTE — LETTER
Date:November 23, 2022      Patient was self referred, no letter generated. Do not send.        [facility] Health Information

## 2022-11-22 NOTE — LETTER
[place] FOR SKULL BASE AND PITUITARY SURGERY  [facility] EAR NOSE AND THROAT CLINIC [address]  [address] FLOOR  [facility] [address]  Phone: [phone]  Fax: [phone]          11/22/2022    RE:   Regis Bryant  [address] [place] [address]      Dear Colleague,    Thank you for referring your patient, Regis Bryant, to the Center for Skull Base and Pituitary Surgery. Please see a copy of my visit note below.      [facility]  Department of Neurosurgery  Center for Skull Base and Pituitary Surgery    November 22, 2022    Reason for visit:  Left vestibular schwannoma, new patient visit      Dear Ms. Nj,    Is a pleasure to see Regis Bryant in the Center for Skull Base and Pituitary Surgery today as a new patient.  As you recall, Mr. Bryant is a pleasant 72-year-old male who presented with asymmetric hearing loss on the left.  He also has tinnitus. 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 His recent work-up included an audiogram and MRI the results of which he was referred here.    Past medical history: Cataracts, BCC, colon polyp, BPH, KAYLYNN, hypertension, hyperlipidemia, GERD, tobacco use    Allergies: No known drug allergies    Medications: Hydrochlorothiazide, lisinopril, omeprazole, simvastatin, doxazosin, finasteride    Family history: Noncontributory    Social history: He is .  He has 3 adult children.  He is retired.  He is a former smoker who quit in 2011.    Exam: He is fluent with speech and very pleasant.  His extraocular movements are intact.  His face is symmetric with activation, rated as a House-Brackman 1.  He has no pronator drift.  He has apparent full strength in all extremities.    Imaging: We reviewed the results of his MRI performed on September 27, 2022 which is uploaded into our system.  This demonstrates a intracanalicular left vestibular schwannoma that measures 13 mm across  the IAC and 7 mm in width.  There is no clear cisternal extension.  No contact with the peduncle, and no hydrocephalus.    Audiogram: Audiogram performed today displays downsloping hearing loss bilaterally.  The left is worse than the right.  Pure-tone average on the right is 56 dB, and on the left is 69 dB.  Word recognition score on the right is 84% at 80 dB, and on the left is 0% at 95 dB.    Assessment:  1.  Left intracanalicular vestibular schwannoma  2.  Bilateral hearing loss    Plan:  1.  We reviewed the results of his MRI and audiogram in setting of his hearing loss.  We described options for management including observation, radiation, and microsurgical resection.  Given that this is his first picture of the tumor and it is small, we recommended observation.    2.  We will plan to see him back in 6 months with an updated MRI and audiogram.  3.  He is using a Bicros system and is benefiting well  4.  We discussed vestibular therapy for his balance should he be interested.  5.  We encouraged her to contact us with any questions or concerns prior to his next visit.        It has been a pleasure to participate in the care of your patient. Please feel free to contact me if I may be of any assistance for Mr. Bryant    Sincerely,  Bran Tavares MD      45 minutes spent on the date of the encounter doing chart review, review of outside records, review of test results, interpretation of tests, patient visit, documentation and discussion with other provider(s)            Again, thank you for allowing me to participate in the care of your patient.      Sincerely,    Bran Tavares MD

## 2022-11-22 NOTE — LETTER
Date:November 28, 2022      Patient was self referred, no letter generated. Do not send.        [facility] Health Information

## 2022-11-22 NOTE — PROGRESS NOTES
AUDIOLOGY REPORT    SUMMARY: Audiology visit completed. See audiogram for results.      RECOMMENDATIONS: Follow-up with ENT.      Melissa Vidal.  Licensed Audiologist  [place] #[id number]

## 2023-04-11 DIAGNOSIS — D33.3 ACOUSTIC NEUROMA (H): Primary | ICD-10-CM

## 2023-05-10 ENCOUNTER — TELEPHONE (OUTPATIENT)
Dept: OTOLARYNGOLOGY | Facility: CLINIC | Age: 73
End: 2023-05-10

## 2023-05-10 NOTE — TELEPHONE ENCOUNTER
"Records Requested May 10, 2023 10:07 AM  [id number]   Facility  [facility]   Fx. [phone]  Alt fx. [id number]   Outcome Message via in-basket from RN   \"Could you get this pt's recent MRI from [facility]\"    Called facility and send request for images pushed + imaging report.    May 15, 2023 at 9:23 AM - Called facility follow up on imaging request send 5/10, spoke to Summer in film room looking for 2023 MR. Wheeler states images and report were push and send on 5/10/23. Request to refax imaging report. No MR done but there was a PET scan done 1/25/23 with Rayus. -Evon    May 30, 2023 at 10:42 AM - Called facility for images push and imaging report for MRI completed on 5/26/23. Also send request -Evon  *UPDATE 2:18 PM - Images resolved in PACS. Imaging report send to scanning. Copy place in Dr Almendarez's rightfax folder -Evon     "

## 2023-05-17 ENCOUNTER — TELEPHONE (OUTPATIENT)
Dept: OTOLARYNGOLOGY | Facility: CLINIC | Age: 73
End: 2023-05-17

## 2023-05-19 ENCOUNTER — TELEPHONE (OUTPATIENT)
Dept: OTOLARYNGOLOGY | Facility: CLINIC | Age: 73
End: 2023-05-19

## 2023-05-20 ENCOUNTER — HEALTH MAINTENANCE LETTER (OUTPATIENT)
Age: 73
End: 2023-05-20

## 2023-05-26 ENCOUNTER — TRANSFERRED RECORDS (OUTPATIENT)
Dept: HEALTH INFORMATION MANAGEMENT | Facility: CLINIC | Age: 73
End: 2023-05-26

## 2023-06-13 ENCOUNTER — TELEPHONE (OUTPATIENT)
Dept: OTOLARYNGOLOGY | Facility: CLINIC | Age: 73
End: 2023-06-13

## 2023-06-20 ENCOUNTER — TRANSCRIBE ORDERS (OUTPATIENT)
Dept: OTHER | Age: 73
End: 2023-06-20

## 2023-06-20 DIAGNOSIS — D43.3 NEOPLASM OF UNCERTAIN BEHAVIOR OF CRANIAL NERVES (H): Primary | ICD-10-CM

## 2023-06-21 ENCOUNTER — TELEPHONE (OUTPATIENT)
Dept: OTOLARYNGOLOGY | Facility: CLINIC | Age: 73
End: 2023-06-21

## 2023-07-26 ENCOUNTER — TELEPHONE (OUTPATIENT)
Dept: OTOLARYNGOLOGY | Facility: CLINIC | Age: 73
End: 2023-07-26

## 2024-04-03 ENCOUNTER — TRANSCRIBE ORDERS (OUTPATIENT)
Dept: OTHER | Age: 74
End: 2024-04-03

## 2024-04-03 DIAGNOSIS — D43.3 NEOPLASM OF UNCERTAIN BEHAVIOR OF CRANIAL NERVES (H): Primary | ICD-10-CM

## 2024-04-04 ENCOUNTER — TELEPHONE (OUTPATIENT)
Dept: OTOLARYNGOLOGY | Facility: CLINIC | Age: 74
End: 2024-04-04

## 2024-04-04 NOTE — TELEPHONE ENCOUNTER
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 Send TE and we'll get him scheduled   Action Taken: Message routed to:  [facility] ([facility] ENT    Travel Screening: Not Applicable

## 2024-04-08 ENCOUNTER — DOCUMENTATION ONLY (OUTPATIENT)
Dept: OTOLARYNGOLOGY | Facility: CLINIC | Age: 74
End: 2024-04-08

## 2024-04-09 NOTE — TELEPHONE ENCOUNTER
[facility] Call Center    Phone Message    May a detailed message be left on voicemail: yes     Reason for Call: Other: Pt calling back. Please call. Thanks.     Action Taken: Other: ENT    Travel Screening: Not Applicable

## 2024-04-09 NOTE — TELEPHONE ENCOUNTER
Writer called patient back. He would like a call for his results. Writer stated we faxed the MRI order on 4/8 and they should be reaching out to the patient and we will call him back as soon as the results are sent to us

## 2024-04-09 NOTE — TELEPHONE ENCOUNTER
LVM that we have a referral for patient to come back here to get MRI, audio and follow up. Gave a few options      [facility]. They should be calling to schedule      We can do a virtual follow up since we have not seen them in a few years or we can call with results once completed      Gave direct number

## 2024-05-09 ENCOUNTER — TELEPHONE (OUTPATIENT)
Dept: OTOLARYNGOLOGY | Facility: CLINIC | Age: 74
End: 2024-05-09

## 2024-05-09 NOTE — TELEPHONE ENCOUNTER
[facility] Call Center    Phone Message    May a detailed message be left on voicemail: yes     Reason for Call: Other: referral for follow up on skullbase please reach out to patient to schedule thank you      Action Taken: Other: ENT    Travel Screening: Not Applicable

## 2024-05-10 ENCOUNTER — TRANSFERRED RECORDS (OUTPATIENT)
Dept: HEALTH INFORMATION MANAGEMENT | Facility: CLINIC | Age: 74
End: 2024-05-10

## 2024-05-14 ENCOUNTER — TELEPHONE (OUTPATIENT)
Dept: OTOLARYNGOLOGY | Facility: CLINIC | Age: 74
End: 2024-05-14

## 2024-05-14 NOTE — TELEPHONE ENCOUNTER
[facility] Call Center    Phone Message    May a detailed message be left on voicemail: yes     Reason for Call: Other: Pt wants to schedule an appt to go over his MRI results he had on 05/10/2024. He is a Skullbase dx. Please call to discuss. Thanks      Action Taken: Message routed to:  [facility] ENT     Travel Screening: Not Applicable

## 2024-05-29 NOTE — TELEPHONE ENCOUNTER
Patient confirmed scheduled appointment:  Date: 7/9  Time: 345  Provider: David  Location: [facility]   Testing/imaging: Audio prior  Additional notes: .

## 2024-06-03 ENCOUNTER — TRANSCRIBE ORDERS (OUTPATIENT)
Dept: OTHER | Age: 74
End: 2024-06-03

## 2024-06-03 DIAGNOSIS — D43.3: Primary | ICD-10-CM

## 2024-07-03 DIAGNOSIS — D33.3 ACOUSTIC NEUROMA (H): Primary | ICD-10-CM

## 2024-07-04 NOTE — PROGRESS NOTES
[facility]  Department of Neurosurgery  [facility]    July 9, 2024    Reason for visit:  Left vestibular schwannoma, followup visit      Dear Ms. Mauro,    Is a pleasure to see Regis Bryant in the [facility] today in followup.  As you recall, Mr. Bryant is a pleasant 74-year-old male who presented with asymmetric hearing loss on the left.  He also has tinnitus. 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 His recent work-up included an audiogram and MRI the results of which he was referred here. We have been observing the tumor since 2022.    Past medical history: Cataracts, BCC, colon polyp, BPH, KAYLYNN, hypertension, hyperlipidemia, GERD, tobacco use    Allergies: No known drug allergies    Medications: Hydrochlorothiazide, lisinopril, omeprazole, simvastatin, doxazosin, finasteride    Family history: Noncontributory    Social history: He is .  He has 3 adult children.  He is retired.  He is a former smoker who quit in 2011.    Exam: He is fluent with speech and very pleasant.  His extraocular movements are intact.  His face is symmetric with activation, rated as a House-Brackman 1.  He has no pronator drift.  He has apparent full strength in all extremities.    Imaging: We reviewed the results of his MRI performed on May 10, 2024 and compared this to prior studies. This demonstrates an intracanalicular left vestibular schwannoma that measures 14 mm across the IAC and 7 mm in width.  There is no clear cisternal extension.  No contact with the peduncle, and no hydrocephalus. There is minimal change compared to 2022's MRI.    Audiogram:  We reviewed the results of the audiogram today which shows:  Right PTA 56 dB, WRS 84 % at 95 dB  Left PTA >68 dB, WRS 4 % at 100 dB    Assessment:  1.  Left intracanalicular vestibular schwannoma  2.  Bilateral hearing loss, class D on the left    Plan:  1.  We reviewed the  results of his MRI and audiogram in setting of his hearing loss.  We described options for management including observation, radiation, and microsurgical resection.  Given that The tumor remains small with minimal change we feel that observation is very reasonable.    2.  We will plan to see him back in 1 year with an updated MRI and audiogram.  4.  We encouraged her to contact us with any questions or concerns prior to his next visit.      It has been a pleasure to participate in the care of your patient. Please feel free to contact me if I may be of any assistance for Mr. Bryant    Sincerely,  Bran Tavares MD      20 minutes spent on the date of the encounter doing chart review, review of outside records, review of test results, interpretation of tests, patient visit, documentation and discussion with other provider(s)

## 2024-07-05 NOTE — PROGRESS NOTES
[place] for Skull Base and Pituitary Surgery      Name: Regis Bryant  MRN: [id number]  Age: 74 year old  : 2024     Chief Complaint:   Follow up     History of Present Illness:   Regis Bryant is a 74 year old male with a history of asymmetrical sensorineural hearing loss, who was seen in the [facility] for follow up regarding vestibular schwannoma (left).  He is seen in conjunction with my colleague in neurosurgery, Dr. Bran Tavares. He had gradual bilateral hearing loss for 8-9 years and started noticing worse hearing in left ear that had gradually worsened over the past 2 years. He has history of noise exposure from . He reports bilateral tinnitus that is worse in the left ear. He has dizziness that he describes as lightheadedness when he stands up. He used to use conventional hearing aids in both ears for about 8 years. His left ear speech discrim had declined from 84% in 2019 to 12% in his testing reportedly which prompted an MRI which showed left sided vestibular schwannoma. He had [employer] hearing aids fitting in 2022, which had been helping him a lot. His previous visit was on 2022, where he decided to proceed with monitoring.  He has no new symptoms.     Review of Systems:   Pertinent items are noted in HPI or as in patient entered ROS below, remainder of complete ROS is negative.        No data to display                  Physical Exam:       Constitutional:  The patient was , well-groomed, and in no acute distress.     Skin: Normal:  warm and pink without rash   Neurologic: Alert and oriented x 3.  CN's III-XII within normal limits.  Voice normal.    Psychiatric: The patient's affect was calm, cooperative, and appropriate.     Communication:  Normal; communicates verbally, normal voice quality.   Respiratory: Breathing comfortably without stridor or exertion of accessory muscles.    Head/Face:  No  lesions or scars. No sinus tenderness.     Eyes: Pupils were equal and reactive.  Extraocular movement intact.     Ears: Pinnae and tragus non-tender.          Audiogram:  AUDIOGRAM: He underwent an audiogram today. This demonstrated:    Right PTA 56 dB, WRS 84 % at 95 dB  Left PTA >68 dB, WRS 4 % at 100 dB    Audiogram was independently reviewed    Imaging:  We reviewed the results of his MRI performed on May 10, 2024 and compared this to prior studies. This demonstrates an intracanalicular left vestibular schwannoma that measures 14 mm across the IAC and 7 mm in width.  There is no clear cisternal extension.  No contact with the peduncle, and no hydrocephalus. There is minimal change compared to 2022's MRI.     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     COMPARISON; 05/26/2023    FINDINGS: Some sequences of this examination degraded by motion.   INTRACRANIAL CONTENTS: Allowing for differences in technique, sow interval growth of the homogeneously enhancing, slightly expansile lesion filling the left internal auditory canal, probably abutting the cochlear aperture, and slightly protruding into the cerebellopontine angle spanning approximately 9 x 14 x 10 mm (AP x RL x SI), previously 7 x 12 x 9 mm on 09/27/2022 and 9 x 12 x 10 mm on 05/26/2023.     Punctate focus of enhancement along the anterior, inferior, and lateral aspect of the right internal auditory canal (series 7, image 6; series 6, image 6), unchanged dating back to 09/27/2022.    Elsewhere, no intracranial mass identified.     Cystic change of a subcentimeter pineal gland, as before. No abnormal parenchymal or meningeal enhancement.     No intracranial hemorrhage.     No acute infarct.     Mid T2 hyperintensity within the supratentorial white matter. Allowing for differences in technique, approximately unchanged.      Ventricles, sulci, and cisterns within normal limits.     SOFT TISSUES: No  significant abnormality.     BONES/BONE MARROW: No suspicious bone marrow signal abnormality     MASTOID AIR CELLS: No significant opacification     MAJOR INTRACRANIAL FLOW VOIDS: Preserved    IMPRESSION: Slow interval growth of the enhancing, expansile lesion filling the left internal auditory canal, as detailed above. As previously noted, this lesion likely reflects a vestibular schwannoma.     Stable punctate focus of enhancement along the anterior, inferior, and lateral aspect of the right internal auditory canal that could also reflect a small vestibular schwannoma. Attention on follow-up recommended.     Stable, mid white matter changes, nonspecific but commonly due to chronic microangiopathic change.     MRI images were independently reviewed.     Assessment and Plan:  Regis Bryant is a 74 year old male with a history of asymmetrical sensorineural hearing loss, who was seen in the [facility] for follow up regarding vestibular schwannoma (left).     The tumor has not shown significant enlargement.  We reviewed the management strategies.  We will continue with observation.  MRI with audiogram in 1 year.       Scribe Disclosure:   I, Dali Rowe, am serving as a scribe; to document services personally performed by Yani Hernandez MD -based on data collection and the provider's statements to me.     Provider Disclosure:  I agree with above History, Review of Systems, Physical exam and Plan.  I have reviewed the content of the documentation and have edited it as needed. I have personally performed the services documented here and the documentation accurately represents those services and the decisions I have made.      Electronically signed by:  Yani Hernandez MD  Otology & Neurotology  [facility]

## 2024-07-09 ENCOUNTER — OFFICE VISIT (OUTPATIENT)
Dept: OTOLARYNGOLOGY | Facility: CLINIC | Age: 74
End: 2024-07-09
Payer: COMMERCIAL

## 2024-07-09 ENCOUNTER — OFFICE VISIT (OUTPATIENT)
Dept: AUDIOLOGY | Facility: CLINIC | Age: 74
End: 2024-07-09
Payer: COMMERCIAL

## 2024-07-09 DIAGNOSIS — H90.3 ASYMMETRIC SNHL (SENSORINEURAL HEARING LOSS): Primary | ICD-10-CM

## 2024-07-09 DIAGNOSIS — D33.3 ACOUSTIC NEUROMA (H): Primary | ICD-10-CM

## 2024-07-09 DIAGNOSIS — H90.3 ASYMMETRICAL SENSORINEURAL HEARING LOSS: ICD-10-CM

## 2024-07-09 DIAGNOSIS — D33.3 VESTIBULAR SCHWANNOMA (H): Primary | ICD-10-CM

## 2024-07-09 DIAGNOSIS — D43.3 NEOPLASM OF UNCERTAIN BEHAVIOR OF CRANIAL NERVES (H): ICD-10-CM

## 2024-07-09 PROCEDURE — 99214 OFFICE O/P EST MOD 30 MIN: CPT | Performed by: OTOLARYNGOLOGY

## 2024-07-09 PROCEDURE — 99213 OFFICE O/P EST LOW 20 MIN: CPT | Performed by: NEUROLOGICAL SURGERY

## 2024-07-09 PROCEDURE — 92557 COMPREHENSIVE HEARING TEST: CPT | Performed by: AUDIOLOGIST

## 2024-07-09 PROCEDURE — 92550 TYMPANOMETRY & REFLEX THRESH: CPT | Performed by: AUDIOLOGIST

## 2024-07-09 ASSESSMENT — PAIN SCALES - GENERAL: PAINLEVEL: NO PAIN (0)

## 2024-07-09 NOTE — LETTER
2024       RE: Regis Bryant  [address] [place] [address]     Dear Colleague,    Thank you for referring your patient, Regis Bryant, to the [facility] E[facility]SE AND THROAT CLINIC [place] [facility]. Please see a copy of my visit note below.      [facility]      Name: Regis Bryant  MRN: [id number]  Age: 74 year old  : 2024     Chief Complaint:   Follow up     History of Present Illness:   Regis Bryant is a 74 year old male with a history of asymmetrical sensorineural hearing loss, who was seen in the [place] [facility] Sk[facility] for follow up regarding vestibular schwannoma (left).  He is seen in conjunction with my colleague in neurosurgery, Dr. Bran Tavares. He had gradual bilateral hearing loss for 8-9 years and started noticing worse hearing in left ear that had gradually worsened over the past 2 years. He has history of noise exposure from . He reports bilateral tinnitus that is worse in the left ear. He has dizziness that he describes as lightheadedness when he stands up. He used to use conventional hearing aids in both ears for about 8 years. His left ear speech discrim had declined from 84% in 2019 to 12% in his testing reportedly which prompted an MRI which showed left sided vestibular schwannoma. He had [employer] hearing aids fitting in 2022, which had been helping him a lot. His previous visit was on 2022, where he decided to proceed with monitoring.  He has no new symptoms.     Review of Systems:   Pertinent items are noted in HPI or as in patient entered ROS below, remainder of complete ROS is negative.        No data to display                  Physical Exam:       Constitutional:  The patient was , well-groomed, and in no acute distress.     Skin: Normal:  warm and pink without rash    Neurologic: Alert and oriented x 3.  CN's III-XII within normal limits.  Voice normal.    Psychiatric: The patient's affect was calm, cooperative, and appropriate.     Communication:  Normal; communicates verbally, normal voice quality.   Respiratory: Breathing comfortably without stridor or exertion of accessory muscles.    Head/Face:  No lesions or scars. No sinus tenderness.     Eyes: Pupils were equal and reactive.  Extraocular movement intact.     Ears: Pinnae and tragus non-tender.          Audiogram:  AUDIOGRAM: He underwent an audiogram today. This demonstrated:    Right PTA 56 dB, WRS 84 % at 95 dB  Left PTA >68 dB, WRS 4 % at 100 dB    Audiogram was independently reviewed    Imaging:  We reviewed the results of his MRI performed on May 10, 2024 and compared this to prior studies. This demonstrates an intracanalicular left vestibular schwannoma that measures 14 mm across the IAC and 7 mm in width.  There is no clear cisternal extension.  No contact with the peduncle, and no hydrocephalus. There is minimal change compared to 2022's MRI.     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     COMPARISON; 05/26/2023    FINDINGS: Some sequences of this examination degraded by motion.   INTRACRANIAL CONTENTS: Allowing for differences in technique, sow interval growth of the homogeneously enhancing, slightly expansile lesion filling the left internal auditory canal, probably abutting the cochlear aperture, and slightly protruding into the cerebellopontine angle spanning approximately 9 x 14 x 10 mm (AP x RL x SI), previously 7 x 12 x 9 mm on 09/27/2022 and 9 x 12 x 10 mm on 05/26/2023.     Punctate focus of enhancement along the anterior, inferior, and lateral aspect of the right internal auditory canal (series 7, image 6; series 6, image 6), unchanged dating back to 09/27/2022.    Elsewhere, no intracranial mass identified.     Cystic change of a  subcentimeter pineal gland, as before. No abnormal parenchymal or meningeal enhancement.     No intracranial hemorrhage.     No acute infarct.     Mid T2 hyperintensity within the supratentorial white matter. Allowing for differences in technique, approximately unchanged.      Ventricles, sulci, and cisterns within normal limits.     SOFT TISSUES: No significant abnormality.     BONES/BONE MARROW: No suspicious bone marrow signal abnormality     MASTOID AIR CELLS: No significant opacification     MAJOR INTRACRANIAL FLOW VOIDS: Preserved    IMPRESSION: Slow interval growth of the enhancing, expansile lesion filling the left internal auditory canal, as detailed above. As previously noted, this lesion likely reflects a vestibular schwannoma.     Stable punctate focus of enhancement along the anterior, inferior, and lateral aspect of the right internal auditory canal that could also reflect a small vestibular schwannoma. Attention on follow-up recommended.     Stable, mid white matter changes, nonspecific but commonly due to chronic microangiopathic change.     MRI images were independently reviewed.     Assessment and Plan:  Regis Bryant is a 74 year old male with a history of asymmetrical sensorineural hearing loss, who was seen in the [facility] for follow up regarding vestibular schwannoma (left).     The tumor has not shown significant enlargement.  We reviewed the management strategies.  We will continue with observation.  MRI with audiogram in 1 year.       Scribe Disclosure:   I, Dali Rowe, am serving as a scribe; to document services personally performed by Yani Hernandez MD -based on data collection and the provider's statements to me.     Provider Disclosure:  I agree with above History, Review of Systems, Physical exam and Plan.  I have reviewed the content of the documentation and have edited it as needed. I have personally performed the services  documented here and the documentation accurately represents those services and the decisions I have made.      Electronically signed by:  Yani Hernandez MD  Otology & Neurotology  [facility]      Again, thank you for allowing me to participate in the care of your patient.      Sincerely,    Yani Hernandez MD

## 2024-07-09 NOTE — PROGRESS NOTES
AUDIOLOGY REPORT    SUMMARY: Audiology visit completed. See audiogram for results.      RECOMMENDATIONS: Follow-up with ENT.    Ellie Sheriff M.A  Audiology Doctoral Extern  [place] #[id number]    I was present with the patient for the entire Audiology appointment, including all procedures/testing performed by the Jake student, and agree with the student s assessment and plan as documented.     Jake Vidales, CCC-A  Clinical Audiologist  [place] #[id number]

## 2024-07-09 NOTE — PATIENT INSTRUCTIONS
You were seen today with Dr. Yani Hernandez and Dr. Bran Tavares. Your primary contact after today's visit is: SUMANTH Mcfarland    Next steps:  Please return to clinic in one year with an updated MRI and audiogram. [facility]        How to Contact Us  Send a [employer] message to your provider.   Call the clinic - your call will be routed appropriately.   ENT Clinic: [phone]   Neurosurgery Clinic: [phone]  To speak directly to an RN Care Coordinator:  Bruna RN: [phone]  Apurva RN: [phone]    Note: We do our best to check voicemail frequently throughout the day and make every effort to return calls within 1-2 business days. For urgent matters, please use the general clinic phone numbers listed above.

## 2024-07-09 NOTE — LETTER
[facility]  [facility] EAR NOSE AND THROAT CLINIC [address]  [address] FLO[address]  [facility] [address]  Phone: [phone]  Fax: [phone]          7/9/2024    RE:   Regis Bryant  [address] [place] [address]      Dear Colleague,    Thank you for referring your patient, Regis Bryant, to the [facility]. Please see a copy of my visit note below.      [facility]  Department of Neurosurgery  [facility]    July 9, 2024    Reason for visit:  Left vestibular schwannoma, followup visit      Dear Ms. Mauro,    Is a pleasure to see Regis Bryant in the [facility] today in followup.  As you recall, Mr. Bryant is a pleasant 74-year-old male who presented with asymmetric hearing loss on the left.  He also has tinnitus. 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 His recent work-up included an audiogram and MRI the results of which he was referred here. We have been observing the tumor since 2022.    Past medical history: Cataracts, BCC, colon polyp, BPH, KAYLYNN, hypertension, hyperlipidemia, GERD, tobacco use    Allergies: No known drug allergies    Medications: Hydrochlorothiazide, lisinopril, omeprazole, simvastatin, doxazosin, finasteride    Family history: Noncontributory    Social history: He is .  He has 3 adult children.  He is retired.  He is a former smoker who quit in 2011.    Exam: He is fluent with speech and very pleasant.  His extraocular movements are intact.  His face is symmetric with activation, rated as a House-Brackman 1.  He has no pronator drift.  He has apparent full strength in all extremities.    Imaging: We reviewed the results of his MRI performed on May 10, 2024 and compared this to prior studies. This demonstrates an intracanalicular left vestibular schwannoma  that measures 14 mm across the IAC and 7 mm in width.  There is no clear cisternal extension.  No contact with the peduncle, and no hydrocephalus. There is minimal change compared to 2022's MRI.    Audiogram:  We reviewed the results of the audiogram today which shows:  Right PTA 56 dB, WRS 84 % at 95 dB  Left PTA >68 dB, WRS 4 % at 100 dB    Assessment:  1.  Left intracanalicular vestibular schwannoma  2.  Bilateral hearing loss, class D on the left    Plan:  1.  We reviewed the results of his MRI and audiogram in setting of his hearing loss.  We described options for management including observation, radiation, and microsurgical resection.  Given that The tumor remains small with minimal change we feel that observation is very reasonable.    2.  We will plan to see him back in 1 year with an updated MRI and audiogram.  4.  We encouraged her to contact us with any questions or concerns prior to his next visit.      It has been a pleasure to participate in the care of your patient. Please feel free to contact me if I may be of any assistance for Mr. Bryant    Sincerely,  Bran Tavares MD      20 minutes spent on the date of the encounter doing chart review, review of outside records, review of test results, interpretation of tests, patient visit, documentation and discussion with other provider(s)          Again, thank you for allowing me to participate in the care of your patient.      Sincerely,    Bran Tavares MD

## 2024-07-09 NOTE — LETTER
7/9/2024       RE: Regis Bryant  [address] [place] [address]     Dear Colleague,    Thank you for referring your patient, Regis Bryant, to the [facility] EAR NOSE AND THROAT CLINIC [place] [facility]. Please see a copy of my visit note below.    [facility]  Department of Neurosurgery  Center for Skull Base and Pituitary Surgery    July 9, 2024    Reason for visit:  Left vestibular schwannoma, followup visit      Dear Ms. Mauro,    Is a pleasure to see Regis Bryant in the [facility] Skull Base [facility] Pituitary Surgery today in followup.  As you recall, Mr. Bryant is a pleasant 74-year-old male who presented with asymmetric hearing loss on the left.  He also has tinnitus. 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 His recent work-up included an audiogram and MRI the results of which he was referred here. We have been observing the tumor since 2022.    Past medical history: Cataracts, BCC, colon polyp, BPH, KAYLYNN, hypertension, hyperlipidemia, GERD, tobacco use    Allergies: No known drug allergies    Medications: Hydrochlorothiazide, lisinopril, omeprazole, simvastatin, doxazosin, finasteride    Family history: Noncontributory    Social history: He is .  He has 3 adult children.  He is retired.  He is a former smoker who quit in 2011.    Exam: He is fluent with speech and very pleasant.  His extraocular movements are intact.  His face is symmetric with activation, rated as a House-Brackman 1.  He has no pronator drift.  He has apparent full strength in all extremities.    Imaging: We reviewed the results of his MRI performed on May 10, 2024 and compared this to prior studies. This demonstrates an intracanalicular left vestibular schwannoma that measures 14 mm across the IAC and 7 mm in width.  There is no clear cisternal extension.  No contact with the peduncle, and no  hydrocephalus. There is minimal change compared to 2022's MRI.    Audiogram:  We reviewed the results of the audiogram today which shows:  Right PTA 56 dB, WRS 84 % at 95 dB  Left PTA >68 dB, WRS 4 % at 100 dB    Assessment:  1.  Left intracanalicular vestibular schwannoma  2.  Bilateral hearing loss, class D on the left    Plan:  1.  We reviewed the results of his MRI and audiogram in setting of his hearing loss.  We described options for management including observation, radiation, and microsurgical resection.  Given that The tumor remains small with minimal change we feel that observation is very reasonable.    2.  We will plan to see him back in 1 year with an updated MRI and audiogram.  4.  We encouraged her to contact us with any questions or concerns prior to his next visit.      It has been a pleasure to participate in the care of your patient. Please feel free to contact me if I may be of any assistance for Mr. Bryant    Sincerely,  Bran Tavares MD      20 minutes spent on the date of the encounter doing chart review, review of outside records, review of test results, interpretation of tests, patient visit, documentation and discussion with other provider(s)        Again, thank you for allowing me to participate in the care of your patient.      Sincerely,    Bran Tavares MD

## 2024-07-27 ENCOUNTER — HEALTH MAINTENANCE LETTER (OUTPATIENT)
Age: 74
End: 2024-07-27

## 2024-11-15 LAB
ALT SERPL-CCNC: 25 U/L (ref 0–55)
AST SERPL-CCNC: 27 U/L (ref 5–40)
CREATININE (EXTERNAL): 1 MG/DL (ref 0.5–1.5)
GLUCOSE (EXTERNAL): 100 MG/DL (ref 70–180)
POTASSIUM (EXTERNAL): 4.1 MMOL/L (ref 3.5–5)

## 2025-04-09 ENCOUNTER — TRANSFERRED RECORDS (OUTPATIENT)
Dept: HEALTH INFORMATION MANAGEMENT | Facility: CLINIC | Age: 75
End: 2025-04-09

## 2025-04-10 ENCOUNTER — TRANSCRIBE ORDERS (OUTPATIENT)
Dept: OTHER | Age: 75
End: 2025-04-10

## 2025-04-10 ENCOUNTER — TELEPHONE (OUTPATIENT)
Dept: OTOLARYNGOLOGY | Facility: CLINIC | Age: 75
End: 2025-04-10

## 2025-04-10 DIAGNOSIS — D33.3 VESTIBULAR SCHWANNOMA (H): Primary | ICD-10-CM

## 2025-04-10 DIAGNOSIS — D43.3 NEOPLASM OF UNCERTAIN BEHAVIOR OF CRANIAL NERVES (H): Primary | ICD-10-CM

## 2025-04-10 NOTE — TELEPHONE ENCOUNTER
[facility] Call Center    Phone Message    May a detailed message be left on voicemail: yes     Reason for Call: Appointment Intake    Referring Provider Name: Bran Tavares MD   Diagnosis and/or Symptoms:     Vestibular schwannoma (H)       NEEDS HEARING EXAM/ DR LILLY (?) AND DR BOSTON APPT TOGETHER- [facility] AT [facility] IS [facility] MAY    Action Taken: Message routed to:  [facility] ([facility] ENT    Travel Screening: Not Applicable

## 2025-06-11 NOTE — PROGRESS NOTES
[facility]  Department of Neurosurgery  Center for Skull Base and Pituitary Surgery    June 17, 2025    Reason for visit:  Left vestibular schwannoma, possible right cochlear schwannoma, followup visit      Dear Ms. Mauro,    Is a pleasure to see Regis Bryant in the Center for Skull Base and Pituitary Surgery today in followup.  As you recall, Mr. Bryant is a pleasant 75-year-old male who presented with asymmetric hearing loss on the left.  He also has tinnitus. 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 His recent work-up included an audiogram and MRI the results of which he was referred here. We have been observing the tumor since 2022.    His wife notices some worsening hearing on the right side. He has a history of noise exposure in the service in [place] with rupture of the left ear drum at one point. No family history of schwannoma.    Past medical history: Cataracts, BCC, colon polyp, BPH, KAYLYNN, hypertension, hyperlipidemia, GERD, tobacco use    Allergies: No known drug allergies    Medications: Hydrochlorothiazide, lisinopril, omeprazole, simvastatin, doxazosin, finasteride    Family history: Noncontributory    Social history: He is .  He has 3 adult children.  He is retired.  He is a former smoker who quit in 2011.    Exam: He is fluent with speech and very pleasant.  His extraocular movements are intact.  His face is symmetric with activation, rated as a House-Brackman 1.  He has no pronator drift.  He has apparent full strength in all extremities.    Imaging: We reviewed the results of his MRI performed on May 8, 2025 and compared this to prior studies. This demonstrates an left vestibular schwannoma that measures 14 mm across the IAC and 7 mm in width.  There is no clear cisternal extension.  No contact with the peduncle, and no hydrocephalus. There is minimal change compared to the MRI from 2024 but slow growth since 2022's MRI. Possible right 1.5mm  enhancement in the distal IAC along course of cochlear nerve.    Audiogram:  We reviewed the results of the audiogram today which shows:  Right PTA 56 dB, WRS 60 % at 75 dB  Left PTA 82 dB, WRS 0 % at 100 dB    Assessment:  1.  Left intracanalicular vestibular schwannoma  2.  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  Bilateral hearing loss, class D on the left    Plan:  1.  We reviewed the results of his MRI and audiogram in setting of his hearing loss.  We described options for management including observation, radiation, and microsurgical resection.  We also pointed out the possible small right sided schwanoma, in setting of his worsening right sided hearing. Given this, we discussed the possibility of observation, medical management, radiosurgery, and surgical resection and rationale for recommended continued observation for the tumors at this point. I would like for him to consider an evaluation for NF2 prior to deciding on radiation treatment and also to meet with our NF2 clinic to determine if avastin is an option given his bilateral hearing loss and worsening right hearing.  2.  We will plan to see him back in 1 year with an updated MRI and audiogram.  3.  We discussed referral to neurofibromatosis clinic for consideration of medical therapy given bilateral hearling loss. We will place a referral.  4.  We encouraged her to contact us with any questions or concerns prior to his next visit.      It has been a pleasure to participate in the care of your patient. Please feel free to contact me if I may be of any assistance for Mr. Bryant    Sincerely,  Bran Tavares MD      30 minutes spent on the date of the encounter doing chart review, review of outside records, review of test results, interpretation of tests, patient visit, documentation and discussion with other provider(s)

## 2025-06-17 ENCOUNTER — OFFICE VISIT (OUTPATIENT)
Dept: OTOLARYNGOLOGY | Facility: CLINIC | Age: 75
End: 2025-06-17
Payer: COMMERCIAL

## 2025-06-17 ENCOUNTER — OFFICE VISIT (OUTPATIENT)
Dept: AUDIOLOGY | Facility: CLINIC | Age: 75
End: 2025-06-17
Payer: COMMERCIAL

## 2025-06-17 VITALS
TEMPERATURE: 97.4 F | BODY MASS INDEX: 26.79 KG/M2 | WEIGHT: 220 LBS | SYSTOLIC BLOOD PRESSURE: 149 MMHG | HEART RATE: 55 BPM | OXYGEN SATURATION: 96 % | HEIGHT: 76 IN | DIASTOLIC BLOOD PRESSURE: 77 MMHG

## 2025-06-17 DIAGNOSIS — H90.3 ASYMMETRIC SNHL (SENSORINEURAL HEARING LOSS): Primary | ICD-10-CM

## 2025-06-17 DIAGNOSIS — D33.3 ACOUSTIC NEUROMA (H): Primary | ICD-10-CM

## 2025-06-17 DIAGNOSIS — D43.3 NEOPLASM OF UNCERTAIN BEHAVIOR OF CRANIAL NERVES (H): ICD-10-CM

## 2025-06-17 PROCEDURE — 1126F AMNT PAIN NOTED NONE PRSNT: CPT | Performed by: NEUROLOGICAL SURGERY

## 2025-06-17 PROCEDURE — 99214 OFFICE O/P EST MOD 30 MIN: CPT | Performed by: NEUROLOGICAL SURGERY

## 2025-06-17 PROCEDURE — 3078F DIAST BP <80 MM HG: CPT | Performed by: NEUROLOGICAL SURGERY

## 2025-06-17 PROCEDURE — 3077F SYST BP >= 140 MM HG: CPT | Performed by: NEUROLOGICAL SURGERY

## 2025-06-17 ASSESSMENT — PAIN SCALES - GENERAL: PAINLEVEL_OUTOF10: NO PAIN (0)

## 2025-06-17 NOTE — PROGRESS NOTES
AUDIOLOGY REPORT    SUMMARY: Audiology visit completed. See audiogram for results.      RECOMMENDATIONS: Follow-up with ENT.    Jake Vidales, CCC-A  Clinical Audiologist  [place] #[id number]

## 2025-06-17 NOTE — NURSING NOTE
"Chief Complaint   Patient presents with    RECHECK     Follow up with MRI and Audio     Blood pressure (!) 163/71, pulse 58, temperature 97.4  F (36.3  C), height 1.93 m (6' 4\"), weight 99.8 kg (220 lb), SpO2 96%.  Fam Cortez LPN    "

## 2025-06-17 NOTE — Clinical Note
6/17/2025       RE: Regis Bryant  [address] [place] [address]     Dear Colleague,    Thank you for referring your patient, Regis Bryant, to the [facility] EAR NOSE AND THROAT CLINIC [place] at [facility]. Please see a copy of my visit note below.    [facility]  Department of Neurosurgery  Center for Skull Base and Pituitary Surgery    June 17, 2025    Reason for visit:  Left vestibular schwannoma, followup visit      Dear Ms. Mauro,    Is a pleasure to see Regis Bryant in the [facility] for Skull Base and Pituitary Surgery today in followup.  As you recall, Mr. Bryant is a pleasant 75-year-old male who presented with asymmetric hearing loss on the left.  He also has tinnitus. 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 His recent work-up included an audiogram and MRI the results of which he was referred here. We have been observing the tumor since 2022.***    Past medical history: Cataracts, BCC, colon polyp, BPH, KAYLYNN, hypertension, hyperlipidemia, GERD, tobacco use    Allergies: No known drug allergies    Medications: Hydrochlorothiazide, lisinopril, omeprazole, simvastatin, doxazosin, finasteride    Family history: Noncontributory    Social history: He is .  He has 3 adult children.  He is retired.  He is a former smoker who quit in 2011.    Exam: He is fluent with speech and very pleasant.  His extraocular movements are intact.  His face is symmetric with activation, rated as a House-Brackman 1.  He has no pronator drift.  He has apparent full strength in all extremities.    Imaging: We reviewed the results of his MRI performed on May 8, 2025 and compared this to prior studies. This demonstrates an left vestibular schwannoma that measures 14 mm across the IAC and 7 mm in width.  There is no clear cisternal extension.  No contact with the peduncle, and no hydrocephalus. There is  minimal change compared to the MRI from 2024 but slow growth since 2022's MRI.    Audiogram:  We reviewed the results of the audiogram today which shows:  Right PTA 56 dB, WRS 60 % at 75 dB  Left PTA 82 dB, WRS 0 % at 100 dB    Assessment:  1.  Left intracanalicular vestibular schwannoma  2.  Bilateral hearing loss, class D on the left    Plan:  1.  We reviewed the results of his MRI and audiogram in setting of his hearing loss.  We described options for management including observation, radiation, and microsurgical resection.  Given that the tumor remains small with ***minimal change we feel that observation*** is very reasonable.    2.  We will plan to see him back in 1*** year with an updated MRI and audiogram.  4.  We encouraged her to contact us with any questions or concerns prior to his next visit.      It has been a pleasure to participate in the care of your patient. Please feel free to contact me if I may be of any assistance for Mr. Bryant    Sincerely,  Bran Tavares MD      *** minutes spent on the date of the encounter doing chart review, review of outside records, review of test results, interpretation of tests, patient visit, documentation and discussion with other provider(s)          Again, thank you for allowing me to participate in the care of your patient.      Sincerely,    Bran Tavares MD

## 2025-06-17 NOTE — PATIENT INSTRUCTIONS
You were seen today with Dr. Bran Tavares. Your primary contact after today's visit is: SUMANTH Mcfarland    Next steps:  Please return to clinic in one year with updated MRI and audiogram prior.   A referral will be sent to Dr. Mora for possible NF2 workup, their team will reach out to you for scheduling.       How to Contact Us  Send a [employer] message to your provider.   Call the clinic - your call will be routed appropriately.   ENT Clinic: [phone]   Neurosurgery Clinic: [phone]  To speak directly to an RN Care Coordinator:  Bruna RN: [phone]  Apurva RN: [phone]    Note: We do our best to check voicemail frequently throughout the day and make every effort to return calls within 1-2 business days. For urgent matters, please use the general clinic phone numbers listed above.

## 2025-06-17 NOTE — LETTER
CENTER FOR SKULL BASE AND PITUITARY SURGERY  [facility] EAR NOSE AND THROAT CLINIC [address]  [address]LO[address]  [facility] [address]  Phone: [phone]  Fax: [phone]          6/17/2025    RE:   Regis Bryant  [address] [place] [address]      Dear Colleague,    Thank you for referring your patient, Regis Bryant, to the Center for Skull Base and Pituitary Surgery. Please see a copy of my visit note below.      [facility]  Department of Neurosurgery  Center for Skull Base and Pituitary Surgery    June 17, 2025    Reason for visit:  Left vestibular schwannoma, possible right cochlear schwannoma, followup visit      Dear Ms. Mauro,    Is a pleasure to see Regis Bryant in the [facility] Pituitary Surgery today in followup.  As you recall, Mr. Bryant is a pleasant 75-year-old male who presented with asymmetric hearing loss on the left.  He also has tinnitus. 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 His recent work-up included an audiogram and MRI the results of which he was referred here. We have been observing the tumor since 2022.    His wife notices some worsening hearing on the right side. He has a history of noise exposure in the service in [place] with rupture of the left ear drum at one point. No family history of schwannoma.    Past medical history: Cataracts, BCC, colon polyp, BPH, KAYLYNN, hypertension, hyperlipidemia, GERD, tobacco use    Allergies: No known drug allergies    Medications: Hydrochlorothiazide, lisinopril, omeprazole, simvastatin, doxazosin, finasteride    Family history: Noncontributory    Social history: He is .  He has 3 adult children.  He is retired.  He is a former smoker who quit in 2011.    Exam: He is fluent with speech and very pleasant.  His extraocular movements are intact.  His face is symmetric with activation, rated as a House-Brackman 1.  He has no  pronator drift.  He has apparent full strength in all extremities.    Imaging: We reviewed the results of his MRI performed on May 8, 2025 and compared this to prior studies. This demonstrates an left vestibular schwannoma that measures 14 mm across the IAC and 7 mm in width.  There is no clear cisternal extension.  No contact with the peduncle, and no hydrocephalus. There is minimal change compared to the MRI from 2024 but slow growth since 2022's MRI. Possible right 1.5mm enhancement in the distal IAC along course of cochlear nerve.    Audiogram:  We reviewed the results of the audiogram today which shows:  Right PTA 56 dB, WRS 60 % at 75 dB  Left PTA 82 dB, WRS 0 % at 100 dB    Assessment:  1.  Left intracanalicular vestibular schwannoma  2.  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  Bilateral hearing loss, class D on the left    Plan:  1.  We reviewed the results of his MRI and audiogram in setting of his hearing loss.  We described options for management including observation, radiation, and microsurgical resection.  We also pointed out the possible small right sided schwanoma, in setting of his worsening right sided hearing. Given this, we discussed the possibility of observation, medical management, radiosurgery, and surgical resection and rationale for recommended continued observation for the tumors at this point. I would like for him to consider an evaluation for NF2 prior to deciding on radiation treatment and also to meet with our NF2 clinic to determine if avastin is an option given his bilateral hearing loss and worsening right hearing.  2.  We will plan to see him back in 1 year with an updated MRI and audiogram.  3.  We discussed referral to neurofibromatosis clinic for consideration of medical therapy given bilateral hearling loss. We will place a referral.  4.  We encouraged her to contact us with any questions or concerns prior to his next visit.      It has been a pleasure to participate in the  care of your patient. Please feel free to contact me if I may be of any assistance for Mr. Bryant    Sincerely,  Bran Tavares MD      30 minutes spent on the date of the encounter doing chart review, review of outside records, review of test results, interpretation of tests, patient visit, documentation and discussion with other provider(s)          Again, thank you for allowing me to participate in the care of your patient.      Sincerely,    Bran Tavares MD

## 2025-06-23 ENCOUNTER — TELEPHONE (OUTPATIENT)
Dept: CONSULT | Facility: CLINIC | Age: 75
End: 2025-06-23

## 2025-06-23 NOTE — TELEPHONE ENCOUNTER
LVM for patient to call back to schedule GC only visit with Medina Mauro. My direct number provided. Will also send message via [employer].

## 2025-06-30 ENCOUNTER — TRANSFERRED RECORDS (OUTPATIENT)
Dept: HEALTH INFORMATION MANAGEMENT | Facility: CLINIC | Age: 75
End: 2025-06-30

## 2025-07-03 ENCOUNTER — VIRTUAL VISIT (OUTPATIENT)
Dept: CONSULT | Facility: CLINIC | Age: 75
End: 2025-07-03

## 2025-07-03 DIAGNOSIS — Z71.83 ENCOUNTER FOR NONPROCREATIVE GENETIC COUNSELING AND TESTING: ICD-10-CM

## 2025-07-03 DIAGNOSIS — D33.3 VESTIBULAR SCHWANNOMA (H): Primary | ICD-10-CM

## 2025-07-03 DIAGNOSIS — Z13.71 ENCOUNTER FOR NONPROCREATIVE GENETIC COUNSELING AND TESTING: ICD-10-CM

## 2025-07-03 PROCEDURE — 96041 GENETIC COUNSELING SVC EA 30: CPT | Mod: GT,95
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
## 2025-07-03 NOTE — PATIENT INSTRUCTIONS
Genetics  [facility]     Contact our nurse care coordinator Josselyn HONEYCUTTN, RN, PHN at [phone] or send a [employer] message for any non-urgent general or medical questions.     If you had genetic testing and have further questions, please contact the genetic counselor:    Medina Mauro I Ph: [phone]    To schedule appointments:  Pediatric Call Center for [facility]: [phone]  Neuropsychology [phone]   Radiology/ Imaging/Echocardiogram: [phone]   Services:   [phone]     You should receive a phone call about your next appointment. If you do not receive this within two weeks of your visit, please call [phone].     IF REFERRALS WERE PLACED/ DISCUSSED DURING THE VISIT, PLEASE LET OUR TEAM KNOW IF YOU DO NOT HEAR FROM THE SCHEDULERS IN 2 WEEKS    If you have not already done so consider signing up for [employer] by speaking with the person at the  on your way out or go to [email or web address] to sign up online.     [employer] enables easy and confidential communication with your care team.
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
## 2025-07-07 ENCOUNTER — TELEPHONE (OUTPATIENT)
Dept: PEDIATRIC HEMATOLOGY/ONCOLOGY | Facility: CLINIC | Age: 75
End: 2025-07-07

## 2025-07-07 NOTE — TELEPHONE ENCOUNTER
Reached out to Regis to schedule an NF2 consult with Avel Mora.  He did not answer so a detailed message was left with my direct contact info for call back.

## 2025-07-10 ENCOUNTER — LAB (OUTPATIENT)
Dept: LAB | Facility: OTHER | Age: 75
End: 2025-07-10
Payer: COMMERCIAL

## 2025-07-10 DIAGNOSIS — D33.3 VESTIBULAR SCHWANNOMA (H): ICD-10-CM

## 2025-08-10 ENCOUNTER — HEALTH MAINTENANCE LETTER (OUTPATIENT)
Age: 75
End: 2025-08-10

## (undated) DEVICE — SYR 03ML LL W/O NDL

## (undated) DEVICE — NDL 30GA 1" 305128

## (undated) DEVICE — ESU ELEC NDL 1" COATED/INSULATED E1465

## (undated) DEVICE — PACK MINOR EYE

## (undated) DEVICE — SOL WATER IRRIG 1000ML BOTTLE 07139-09

## (undated) DEVICE — NDL 30GA 0.5" 305106

## (undated) DEVICE — GLOVE PROTEXIS W/NEU-THERA 7.5  2D73TE75

## (undated) DEVICE — ESU EYE HIGH TEMP 65410-183

## (undated) DEVICE — PREP CHLORAPREP 26ML TINTED ORANGE  260815

## (undated) DEVICE — MARKER SKIN DOUBLE TIP W/FLEXI-RULER W/LABELS

## (undated) RX ORDER — PROPOFOL 10 MG/ML
INJECTION, EMULSION INTRAVENOUS
Status: DISPENSED
Start: 2022-03-09

## (undated) RX ORDER — ACETAMINOPHEN 325 MG/1
TABLET ORAL
Status: DISPENSED
Start: 2022-03-09

## (undated) RX ORDER — FENTANYL CITRATE 50 UG/ML
INJECTION, SOLUTION INTRAMUSCULAR; INTRAVENOUS
Status: DISPENSED
Start: 2022-03-09